# Patient Record
Sex: MALE | Race: BLACK OR AFRICAN AMERICAN | URBAN - METROPOLITAN AREA
[De-identification: names, ages, dates, MRNs, and addresses within clinical notes are randomized per-mention and may not be internally consistent; named-entity substitution may affect disease eponyms.]

---

## 2017-03-09 ENCOUNTER — PATIENT MESSAGE (OUTPATIENT)
Dept: FAMILY MEDICINE CLINIC | Facility: CLINIC | Age: 42
End: 2017-03-09

## 2017-03-13 NOTE — TELEPHONE ENCOUNTER
From: Vahe Camarena  To: rCis Cheatham DO  Sent: 3/9/2017 12:28 PM CST  Subject: Other    I have a question about PSA resulted on 11/27/16, 7:40 AM.    Would it be possible that I get these results fax to Dr. Mohini Zee office his fax number is [phone]

## 2017-11-08 ENCOUNTER — OFFICE VISIT (OUTPATIENT)
Dept: FAMILY MEDICINE CLINIC | Facility: CLINIC | Age: 42
End: 2017-11-08

## 2017-11-08 VITALS
DIASTOLIC BLOOD PRESSURE: 72 MMHG | HEART RATE: 77 BPM | HEIGHT: 75 IN | TEMPERATURE: 99 F | WEIGHT: 218 LBS | RESPIRATION RATE: 16 BRPM | BODY MASS INDEX: 27.1 KG/M2 | SYSTOLIC BLOOD PRESSURE: 118 MMHG

## 2017-11-08 DIAGNOSIS — R06.83 SNORING: Primary | ICD-10-CM

## 2017-11-08 DIAGNOSIS — R29.818 SUSPECTED SLEEP APNEA: ICD-10-CM

## 2017-11-08 PROCEDURE — 99214 OFFICE O/P EST MOD 30 MIN: CPT | Performed by: FAMILY MEDICINE

## 2017-11-08 PROCEDURE — 99212 OFFICE O/P EST SF 10 MIN: CPT | Performed by: FAMILY MEDICINE

## 2017-11-08 NOTE — PROGRESS NOTES
HPI:    Patient ID: Judi Michaud is a 38year old male. Snoring   This is a chronic problem. The current episode started more than 1 year ago. The problem occurs constantly. The problem has been gradually worsening. Associated symptoms include fatigue.  A symptoms worsen or fail to improve.          [id number]

## 2017-11-11 ENCOUNTER — OFFICE VISIT (OUTPATIENT)
Dept: FAMILY MEDICINE CLINIC | Facility: CLINIC | Age: 42
End: 2017-11-11

## 2017-11-11 ENCOUNTER — APPOINTMENT (OUTPATIENT)
Dept: LAB | Age: 42
End: 2017-11-11
Attending: FAMILY MEDICINE
Payer: COMMERCIAL

## 2017-11-11 VITALS
HEART RATE: 92 BPM | BODY MASS INDEX: 26.98 KG/M2 | WEIGHT: 217 LBS | RESPIRATION RATE: 16 BRPM | DIASTOLIC BLOOD PRESSURE: 73 MMHG | SYSTOLIC BLOOD PRESSURE: 136 MMHG | TEMPERATURE: 98 F | HEIGHT: 75 IN

## 2017-11-11 DIAGNOSIS — Z13.220 LIPID SCREENING: ICD-10-CM

## 2017-11-11 DIAGNOSIS — Z13.29 THYROID DISORDER SCREEN: ICD-10-CM

## 2017-11-11 DIAGNOSIS — R53.83 FATIGUE, UNSPECIFIED TYPE: ICD-10-CM

## 2017-11-11 DIAGNOSIS — Z00.00 ENCOUNTER FOR SCREENING AND PREVENTATIVE CARE: Primary | ICD-10-CM

## 2017-11-11 DIAGNOSIS — Z12.5 PROSTATE CANCER SCREENING: ICD-10-CM

## 2017-11-11 DIAGNOSIS — N52.9 ERECTILE DYSFUNCTION, UNSPECIFIED ERECTILE DYSFUNCTION TYPE: ICD-10-CM

## 2017-11-11 PROCEDURE — 99396 PREV VISIT EST AGE 40-64: CPT | Performed by: FAMILY MEDICINE

## 2017-11-11 RX ORDER — TADALAFIL 20 MG/1
20 TABLET ORAL
Qty: 8 TABLET | Refills: 2 | Status: SHIPPED | OUTPATIENT
Start: 2017-11-11 | End: 2017-11-11

## 2017-11-11 RX ORDER — TADALAFIL 20 MG/1
20 TABLET ORAL
Qty: 8 TABLET | Refills: 2 | Status: SHIPPED | OUTPATIENT
Start: 2017-11-11 | End: 2018-01-06

## 2017-11-11 NOTE — PROGRESS NOTES
HPI:    Patient ID: Irma Cruz is a 38year old male.     38year old AA male here for complete preventive care physical and for status update on any confirmed chronic medical illnesses and follow up on any previous labs or procedures that were suggestive place, and time. No cranial nerve deficit. ASSESSMENT/PLAN:   1. Encounter for screening and preventative care  Ordered and done  - EKG 12-LEAD;  Future  - CBC WITH DIFFERENTIAL WITH PLATELET  - COMP METABOLIC PANEL (14)  - URINALYSIS, ROUTINE

## 2017-12-27 ENCOUNTER — OFFICE VISIT (OUTPATIENT)
Dept: SLEEP CENTER | Age: 42
End: 2017-12-27
Attending: FAMILY MEDICINE
Payer: COMMERCIAL

## 2017-12-27 DIAGNOSIS — Z76.89 SLEEP CONCERN: Primary | ICD-10-CM

## 2017-12-27 PROCEDURE — 95810 POLYSOM 6/> YRS 4/> PARAM: CPT

## 2017-12-29 NOTE — PROCEDURES
[facility]  Accredited by the [employer] (AASM)    PATIENT'S NAME: Rehan Wild   ATTENDING PHYSICIAN: Leslie Mariscal DO   REFERRING PHYSICIAN: Leslie Mariscal DO   PATIENT ACCOUNT #: [de-identified] LOCATION: Sleep 7.5 events per hour. There were no significant periodic limb movements. The lowest desaturation was to 84%. The average heart rate is 81 beats per minute. There was no significant bradycardia, asystole, tachycardia, nor atrial fibrillation.   Sleep arch

## 2018-01-06 DIAGNOSIS — N52.9 ERECTILE DYSFUNCTION, UNSPECIFIED ERECTILE DYSFUNCTION TYPE: ICD-10-CM

## 2018-01-08 RX ORDER — TADALAFIL 20 MG/1
20 TABLET ORAL
Qty: 8 TABLET | Refills: 2 | Status: SHIPPED
Start: 2018-01-08 | End: 2018-10-12 | Stop reason: ALTCHOICE

## 2018-01-08 NOTE — TELEPHONE ENCOUNTER
Refill Protocol Appointment Criteria  · Appointment scheduled in the past 12 months or in the next 3 months  Recent Outpatient Visits            1 week ago Sleep concern    [facility]    Office Visit    1 month ago Encounter for screeni

## 2018-01-08 NOTE — TELEPHONE ENCOUNTER
From: Debbie Eason  Sent: 1/6/2018 8:34 PM CST  Subject: Medication Renewal Request    Debbie Eason would like a refill of the following medications:      Tadalafil (CIALIS) 20 MG Oral Tab Kristina Diez DO]    Preferred pharmacy: [facility]/PHARMACY #[id number] -

## 2018-01-09 ENCOUNTER — TELEPHONE (OUTPATIENT)
Dept: OTHER | Age: 43
End: 2018-01-09

## 2018-01-09 DIAGNOSIS — G47.30 SLEEP APNEA, UNSPECIFIED TYPE: Primary | ICD-10-CM

## 2018-01-09 NOTE — TELEPHONE ENCOUNTER
Patient returning a call (verified name and ), advised Dr Danitza Tilley note and agrees with the plan, Number given to call to make an appointment [phone]. Oneal Claude       Notes Recorded by La Antonio DO on 2018 at 2:39 PM CST  Mild sleep apnea co

## 2018-01-09 NOTE — TELEPHONE ENCOUNTER
----- Message from Rosa Melendez RN sent at 1/4/2018  9:38 AM CST -----      ----- Message -----  From: Gloria Garrett DO  Sent: 1/2/2018   2:39 PM  To: Em Fm Opo Lpn/Cma    Mild sleep apnea confirmed which becomes severe during REM (deep) sleep.  CPA

## 2018-03-12 ENCOUNTER — PATIENT MESSAGE (OUTPATIENT)
Dept: FAMILY MEDICINE CLINIC | Facility: CLINIC | Age: 43
End: 2018-03-12

## 2018-03-13 NOTE — TELEPHONE ENCOUNTER
From: Daniel Prado  To: Marylin Krause DO  Sent: 3/12/2018 12:37 PM CDT  Subject: Referral Request    Good afternoon  I was wondering would I have to come into your office, for a referral a neck specialist for my neck injury that I had a fe

## 2018-03-15 ENCOUNTER — OFFICE VISIT (OUTPATIENT)
Dept: FAMILY MEDICINE CLINIC | Facility: CLINIC | Age: 43
End: 2018-03-15

## 2018-03-15 VITALS
TEMPERATURE: 99 F | RESPIRATION RATE: 17 BRPM | SYSTOLIC BLOOD PRESSURE: 111 MMHG | HEIGHT: 75 IN | WEIGHT: 215.81 LBS | BODY MASS INDEX: 26.83 KG/M2 | HEART RATE: 86 BPM | DIASTOLIC BLOOD PRESSURE: 80 MMHG

## 2018-03-15 DIAGNOSIS — M54.12 CERVICAL RADICULOPATHY: Primary | ICD-10-CM

## 2018-03-15 PROCEDURE — 99212 OFFICE O/P EST SF 10 MIN: CPT | Performed by: FAMILY MEDICINE

## 2018-03-15 PROCEDURE — 99213 OFFICE O/P EST LOW 20 MIN: CPT | Performed by: FAMILY MEDICINE

## 2018-03-15 RX ORDER — NABUMETONE 500 MG/1
1000 TABLET, FILM COATED ORAL DAILY
Qty: 60 TABLET | Refills: 3 | Status: SHIPPED | OUTPATIENT
Start: 2018-03-15 | End: 2018-11-29 | Stop reason: ALTCHOICE

## 2018-03-15 RX ORDER — CYCLOBENZAPRINE HCL 10 MG
10 TABLET ORAL 3 TIMES DAILY
Qty: 30 TABLET | Refills: 0 | Status: SHIPPED | OUTPATIENT
Start: 2018-03-15 | End: 2018-03-26

## 2018-03-15 NOTE — PROGRESS NOTES
HPI:    Patient ID: Hayley Melendrez is a 38year old male. Neck Pain    This is a chronic problem. The current episode started more than 1 year ago. The problem has been gradually worsening. The pain is present in the left side.  The quality of the pain is d also showing congenital central canal narrowing. Recommend returning to physical therapy, schedule appointment with physiatry, nabumetone as directed daily, cyclobenzaprine at at bedtime. Reviewed instructions and side effects of all medications.   Call i

## 2018-03-27 RX ORDER — CYCLOBENZAPRINE HCL 10 MG
10 TABLET ORAL 3 TIMES DAILY
Qty: 30 TABLET | Refills: 0 | Status: SHIPPED | OUTPATIENT
Start: 2018-03-27 | End: 2018-04-16

## 2018-05-22 ENCOUNTER — PATIENT MESSAGE (OUTPATIENT)
Dept: FAMILY MEDICINE CLINIC | Facility: CLINIC | Age: 43
End: 2018-05-22

## 2018-05-22 NOTE — TELEPHONE ENCOUNTER
From: Irma Cruz  To: Gabby Dukes DO  Sent: 5/22/2018 10:22 AM CDT  Subject: Prescription Question    How are you Dr. Bradley [place] ?  A few years back you gave me prescription for Chinax I believe the name was to stop smoking cigarettes and I used it

## 2018-07-10 RX ORDER — VARENICLINE TARTRATE 0.5 (11)-1
KIT ORAL
Qty: 1 PACKAGE | Refills: 0 | Status: SHIPPED | OUTPATIENT
Start: 2018-07-10 | End: 2019-01-14

## 2018-09-04 ENCOUNTER — TELEPHONE (OUTPATIENT)
Dept: OTHER | Age: 43
End: 2018-09-04

## 2018-09-04 NOTE — TELEPHONE ENCOUNTER
Pt calling to request CPAP machine. Per pt he has not scheduled titration. Informed pt needs to schedule Titration to obtain settings. Order can then be placed for machine. Pt verb understanding. Sleep Center number given to pt.

## 2018-09-10 ENCOUNTER — OFFICE VISIT (OUTPATIENT)
Dept: SLEEP CENTER | Age: 43
End: 2018-09-10
Attending: FAMILY MEDICINE
Payer: COMMERCIAL

## 2018-09-10 DIAGNOSIS — Z76.89 SLEEP CONCERN: Primary | ICD-10-CM

## 2018-09-10 PROCEDURE — 95811 POLYSOM 6/>YRS CPAP 4/> PARM: CPT

## 2018-09-12 NOTE — PROCEDURES
[address] [facility]  Accredited by the [employer] Medicine (AASM)    PATIENT'S NAME: Romi Hood   ATTENDING PHYSICIAN: Khushbu Simon DO   REFERRING PHYSICIAN: Khushbu Simon DO   PATIENT ACCOUNT #: [de-identified] LOCATION: Sleep 4.9 events per hour. There were no significant periodic limb movements. The lowest desaturation was to 85%. The average heart rate is 65 beats per minute, and there was no significant bradycardia, asystole, tachycardia, or atrial fibrillation.       CPAP

## 2018-10-12 ENCOUNTER — OFFICE VISIT (OUTPATIENT)
Dept: FAMILY MEDICINE CLINIC | Facility: CLINIC | Age: 43
End: 2018-10-12
Payer: COMMERCIAL

## 2018-10-12 VITALS
RESPIRATION RATE: 16 BRPM | TEMPERATURE: 98 F | HEART RATE: 89 BPM | BODY MASS INDEX: 28.15 KG/M2 | DIASTOLIC BLOOD PRESSURE: 84 MMHG | WEIGHT: 217 LBS | HEIGHT: 73.75 IN | SYSTOLIC BLOOD PRESSURE: 130 MMHG

## 2018-10-12 DIAGNOSIS — G47.33 OSA ON CPAP: Primary | ICD-10-CM

## 2018-10-12 DIAGNOSIS — Z99.89 OSA ON CPAP: Primary | ICD-10-CM

## 2018-10-12 DIAGNOSIS — M99.01 CERVICOTHORACIC SOMATIC DYSFUNCTION: ICD-10-CM

## 2018-10-12 PROCEDURE — 99214 OFFICE O/P EST MOD 30 MIN: CPT | Performed by: FAMILY MEDICINE

## 2018-10-12 PROCEDURE — 99212 OFFICE O/P EST SF 10 MIN: CPT | Performed by: FAMILY MEDICINE

## 2018-10-12 PROCEDURE — 98925 OSTEOPATH MANJ 1-2 REGIONS: CPT | Performed by: FAMILY MEDICINE

## 2018-10-12 RX ORDER — CLINDAMYCIN PHOSPHATE 10 MG/G
GEL TOPICAL
Refills: 3 | COMMUNITY
Start: 2018-10-06 | End: 2019-08-22 | Stop reason: ALTCHOICE

## 2018-10-12 RX ORDER — MINOCYCLINE HYDROCHLORIDE 100 MG/1
100 TABLET ORAL 2 TIMES DAILY
Refills: 2 | COMMUNITY
Start: 2018-10-06 | End: 2019-08-22 | Stop reason: ALTCHOICE

## 2018-10-12 NOTE — PATIENT INSTRUCTIONS
Comply with CPAP appliance. Referred to pulmonary (apnea expert). Encouraged physical fitness and daily physical activity daily (PLAY). Recommend weight loss via daily exercising and consistent healthy dietary changes.   Monitor blood pressures and recor

## 2018-10-24 ENCOUNTER — PATIENT MESSAGE (OUTPATIENT)
Dept: FAMILY MEDICINE CLINIC | Facility: CLINIC | Age: 43
End: 2018-10-24

## 2018-10-24 NOTE — TELEPHONE ENCOUNTER
From: Rahul Cortez  To: Penny Eaton,   Sent: 10/24/2018 12:21 PM CDT  Subject: Other    I was wondering how does it usually take to hear from someone about setting me up for the sleep apnea machine because at the moment I haven't heard from anyone

## 2018-10-26 ENCOUNTER — TELEPHONE (OUTPATIENT)
Dept: OTHER | Age: 43
End: 2018-10-26

## 2018-10-26 NOTE — TELEPHONE ENCOUNTER
Pt called stated he have not heard anything about his CPAP since LOV 10/12/18- order placed 10/12/18- no documentation of info faxed to supplier- hand off to [facility]

## 2018-11-29 ENCOUNTER — OFFICE VISIT (OUTPATIENT)
Dept: FAMILY MEDICINE CLINIC | Facility: CLINIC | Age: 43
End: 2018-11-29
Payer: COMMERCIAL

## 2018-11-29 VITALS
WEIGHT: 224 LBS | HEIGHT: 74 IN | TEMPERATURE: 97 F | DIASTOLIC BLOOD PRESSURE: 84 MMHG | SYSTOLIC BLOOD PRESSURE: 110 MMHG | BODY MASS INDEX: 28.75 KG/M2 | HEART RATE: 90 BPM

## 2018-11-29 DIAGNOSIS — G47.33 OSA ON CPAP: ICD-10-CM

## 2018-11-29 DIAGNOSIS — M99.01 CERVICOTHORACIC SOMATIC DYSFUNCTION: ICD-10-CM

## 2018-11-29 DIAGNOSIS — Z11.3 SCREEN FOR STD (SEXUALLY TRANSMITTED DISEASE): ICD-10-CM

## 2018-11-29 DIAGNOSIS — Z99.89 OSA ON CPAP: ICD-10-CM

## 2018-11-29 DIAGNOSIS — M99.03 SOMATIC DYSFUNCTION OF LUMBOSACRAL REGION: ICD-10-CM

## 2018-11-29 DIAGNOSIS — Z13.29 THYROID DISORDER SCREEN: ICD-10-CM

## 2018-11-29 DIAGNOSIS — Z13.220 LIPID SCREENING: ICD-10-CM

## 2018-11-29 DIAGNOSIS — Z12.5 PROSTATE CANCER SCREENING: Primary | ICD-10-CM

## 2018-11-29 DIAGNOSIS — Z00.00 ROUTINE PHYSICAL EXAMINATION: ICD-10-CM

## 2018-11-29 PROCEDURE — 93005 ELECTROCARDIOGRAM TRACING: CPT | Performed by: FAMILY MEDICINE

## 2018-11-29 PROCEDURE — 99396 PREV VISIT EST AGE 40-64: CPT | Performed by: FAMILY MEDICINE

## 2018-11-29 PROCEDURE — 98926 OSTEOPATH MANJ 3-4 REGIONS: CPT | Performed by: FAMILY MEDICINE

## 2018-11-29 PROCEDURE — 90471 IMMUNIZATION ADMIN: CPT | Performed by: FAMILY MEDICINE

## 2018-11-29 PROCEDURE — 93000 ELECTROCARDIOGRAM COMPLETE: CPT | Performed by: FAMILY MEDICINE

## 2018-11-29 PROCEDURE — 90686 IIV4 VACC NO PRSV 0.5 ML IM: CPT | Performed by: FAMILY MEDICINE

## 2018-11-29 NOTE — PROCEDURES
Multiple vertebral segments in cervical, lumbar and thoracic region misaligned. Manual osteopathic manipulative therapy performed and immediate relief obtained. Patient instantaneously improved.

## 2018-11-29 NOTE — PATIENT INSTRUCTIONS
All adult screening ordered and done appropriate for patient's age and gender and risk factors and complaints. OMT done. Monitor blood pressures and record at home. Limit salt intake. Encouraged physical fitness and daily physical activity daily (PLAY).

## 2018-11-29 NOTE — PROGRESS NOTES
HPI:    Patient ID: Myra Albert is a 37year old male.     37year old AA male here for complete preventive care physical and for status update on any confirmed chronic medical illnesses and follow up on any previous labs or procedures that were suggestive MG X 42 Oral Tab AS ON PACKAGE Disp: 1 Package Rfl: 0     Allergies:No Known Allergies   PHYSICAL EXAM:   Physical Exam    Constitutional: He is oriented to person, place, and time. He appears well-developed and well-nourished.    HENT:   Right Ear: Tympani (14)  - ELECTROCARDIOGRAM, COMPLETENormal sinus rate and rhythm. No Q waves. Possible early repolarization (anterolateral) Nonspecific T wave changes. Borderline EKG.  Nothing further needed at this time.;     8. EFRA on CPAP  Comply    Orders Placed This En

## 2018-11-30 ENCOUNTER — TELEPHONE (OUTPATIENT)
Dept: FAMILY MEDICINE CLINIC | Facility: CLINIC | Age: 43
End: 2018-11-30

## 2018-11-30 DIAGNOSIS — M62.830 SPASM OF MUSCLE OF LOWER BACK: Primary | ICD-10-CM

## 2018-11-30 RX ORDER — CYCLOBENZAPRINE HCL 5 MG
5 TABLET ORAL NIGHTLY
Qty: 30 TABLET | Refills: 0 | Status: SHIPPED | OUTPATIENT
Start: 2018-11-30 | End: 2019-01-11

## 2018-11-30 NOTE — TELEPHONE ENCOUNTER
Patient aware that Cyclobenzaprine HCl 5 MG Oral Tab sent to pharmacy. Patient wanted RN to tell Dr. Quan Aparicio, thank you.

## 2018-11-30 NOTE — TELEPHONE ENCOUNTER
Patient seen in [address] [place] [address] Rd 11/29/18, mentioned at appt to be having some back pain for the last week, pulled lower back muscle shoveling the snow. Has been using Tylenol for pain also applying heat to lower back area.  Symptoms continue with little improvement over

## 2019-01-11 DIAGNOSIS — M62.830 SPASM OF MUSCLE OF LOWER BACK: ICD-10-CM

## 2019-01-12 NOTE — TELEPHONE ENCOUNTER
Review pended refill request as it does not fall under a protocol.     Requested Prescriptions     Pending Prescriptions Disp Refills   • CYCLOBENZAPRINE HCL 5 MG Oral Tab [Pharmacy Med Name: CYCLOBENZAPRINE 5 MG TABLET] 30 tablet 0     Sig: TAKE 1 TABLET B

## 2019-01-14 ENCOUNTER — OFFICE VISIT (OUTPATIENT)
Dept: PULMONOLOGY | Facility: CLINIC | Age: 44
End: 2019-01-14
Payer: COMMERCIAL

## 2019-01-14 VITALS
WEIGHT: 226.63 LBS | HEART RATE: 86 BPM | HEIGHT: 74 IN | DIASTOLIC BLOOD PRESSURE: 77 MMHG | BODY MASS INDEX: 29.09 KG/M2 | SYSTOLIC BLOOD PRESSURE: 153 MMHG | OXYGEN SATURATION: 95 %

## 2019-01-14 DIAGNOSIS — G47.33 OSA (OBSTRUCTIVE SLEEP APNEA): Primary | ICD-10-CM

## 2019-01-14 PROCEDURE — 99243 OFF/OP CNSLTJ NEW/EST LOW 30: CPT | Performed by: INTERNAL MEDICINE

## 2019-01-14 PROCEDURE — 99212 OFFICE O/P EST SF 10 MIN: CPT | Performed by: INTERNAL MEDICINE

## 2019-01-14 RX ORDER — CYCLOBENZAPRINE HCL 5 MG
TABLET ORAL
Qty: 30 TABLET | Refills: 0 | Status: SHIPPED | OUTPATIENT
Start: 2019-01-14 | End: 2019-01-14

## 2019-01-14 NOTE — PROGRESS NOTES
Dear Teo Calabrese:           As you know, Ruth Gómez is a 29-year-old male who I am now evaluating for obstructive sleep apnea. HISTORY OF PRESENT ILLNESS: The patient has a history of snoring and restless sleep with 2-3 times per night nocturia.   He has diffi round and reactive to light and accommodation. Neck without adenopathy, thyromegaly, JVD nor bruit. Lungs clear to auscultation and percussion. Cardiac regular rate and rhythm no murmur.  Abdomen nontender, without hepatosplenomegaly and no mass appreciable

## 2019-07-10 ENCOUNTER — PATIENT MESSAGE (OUTPATIENT)
Dept: FAMILY MEDICINE CLINIC | Facility: CLINIC | Age: 44
End: 2019-07-10

## 2019-07-11 NOTE — TELEPHONE ENCOUNTER
Please let patient know Dr. Bradley [place] out of town. But I think it is most likely he will want the patient to make appointment.

## 2019-07-11 NOTE — TELEPHONE ENCOUNTER
From: Debbie Eason  To: Reyes Puller, DO  Sent: 7/10/2019 10:54 PM CDT  Subject: Prescription Question    Forrest Brooks([facility])lol, but seriously I was wondering would I be able to get a prescription for Erectile Dysfunction without coming in for

## 2019-08-22 ENCOUNTER — OFFICE VISIT (OUTPATIENT)
Dept: FAMILY MEDICINE CLINIC | Facility: CLINIC | Age: 44
End: 2019-08-22
Payer: COMMERCIAL

## 2019-08-22 VITALS
TEMPERATURE: 98 F | DIASTOLIC BLOOD PRESSURE: 82 MMHG | HEART RATE: 73 BPM | RESPIRATION RATE: 17 BRPM | HEIGHT: 74 IN | WEIGHT: 221 LBS | BODY MASS INDEX: 28.36 KG/M2 | SYSTOLIC BLOOD PRESSURE: 130 MMHG

## 2019-08-22 DIAGNOSIS — N52.9 ERECTILE DYSFUNCTION, UNSPECIFIED ERECTILE DYSFUNCTION TYPE: Primary | ICD-10-CM

## 2019-08-22 PROCEDURE — 99214 OFFICE O/P EST MOD 30 MIN: CPT | Performed by: FAMILY MEDICINE

## 2019-08-22 RX ORDER — TADALAFIL 20 MG/1
20 TABLET ORAL
Qty: 8 TABLET | Refills: 2 | Status: SHIPPED | OUTPATIENT
Start: 2019-08-22 | End: 2020-09-14

## 2019-08-22 NOTE — PATIENT INSTRUCTIONS
Monitor blood pressures and record at home. Limit salt intake. Encouraged physical fitness and daily physical activity daily (PLAY). Cialis prescribed at 20 mg every 3 days as needed for ED.

## 2019-08-22 NOTE — PROGRESS NOTES
HPI:    Patient ID: Priscilla Pope is a 53year old male. Patient is a 53-year-old -American male who presents today for advice and/or treatment/prescription for ED. Patient has had issues with ED now on and off for the last 2 years.   The patient c home. Limit salt intake. Encouraged physical fitness and daily physical activity daily (PLAY). Cialis prescribed at 20 mg every 3 days as needed for ED. Return in about 3 months (around 11/22/2019), or if symptoms worsen or fail to improve.

## 2019-09-12 ENCOUNTER — OFFICE VISIT (OUTPATIENT)
Dept: FAMILY MEDICINE CLINIC | Facility: CLINIC | Age: 44
End: 2019-09-12
Payer: COMMERCIAL

## 2019-09-12 ENCOUNTER — APPOINTMENT (OUTPATIENT)
Dept: LAB | Facility: HOSPITAL | Age: 44
End: 2019-09-12
Attending: FAMILY MEDICINE
Payer: COMMERCIAL

## 2019-09-12 VITALS
BODY MASS INDEX: 28.36 KG/M2 | SYSTOLIC BLOOD PRESSURE: 130 MMHG | WEIGHT: 221 LBS | HEIGHT: 74 IN | DIASTOLIC BLOOD PRESSURE: 72 MMHG | RESPIRATION RATE: 17 BRPM | HEART RATE: 68 BPM

## 2019-09-12 DIAGNOSIS — Z12.11 COLON CANCER SCREENING: ICD-10-CM

## 2019-09-12 DIAGNOSIS — Z12.5 PROSTATE CANCER SCREENING: ICD-10-CM

## 2019-09-12 DIAGNOSIS — L91.0 KELOID: ICD-10-CM

## 2019-09-12 DIAGNOSIS — Z13.29 THYROID DISORDER SCREEN: ICD-10-CM

## 2019-09-12 DIAGNOSIS — Z13.220 LIPID SCREENING: ICD-10-CM

## 2019-09-12 DIAGNOSIS — Z00.00 ROUTINE PHYSICAL EXAMINATION: Primary | ICD-10-CM

## 2019-09-12 PROCEDURE — 11900 INJECT SKIN LESIONS </W 7: CPT | Performed by: FAMILY MEDICINE

## 2019-09-12 PROCEDURE — 93000 ELECTROCARDIOGRAM COMPLETE: CPT | Performed by: FAMILY MEDICINE

## 2019-09-12 PROCEDURE — 36415 COLL VENOUS BLD VENIPUNCTURE: CPT

## 2019-09-12 PROCEDURE — 99396 PREV VISIT EST AGE 40-64: CPT | Performed by: FAMILY MEDICINE

## 2019-09-12 RX ORDER — TRIAMCINOLONE ACETONIDE 40 MG/ML
40 INJECTION, SUSPENSION INTRA-ARTICULAR; INTRAMUSCULAR ONCE
Status: SHIPPED | OUTPATIENT
Start: 2019-09-12

## 2019-09-12 NOTE — PATIENT INSTRUCTIONS
All adult screening ordered and done appropriate for patient's age and gender and risk factors and complaints. Monitor blood pressures and record at home. Limit salt intake. Encouraged physical fitness and daily physical activity daily.   Patient referred

## 2019-09-13 LAB
% FREE PSA: 25 % (CALC)
ABSOLUTE BASOPHILS: 29 CELLS/UL (ref 0–200)
ABSOLUTE EOSINOPHILS: 99 CELLS/UL (ref 15–500)
ABSOLUTE LYMPHOCYTES: 1995 CELLS/UL (ref 850–3900)
ABSOLUTE MONOCYTES: 539 CELLS/UL (ref 200–950)
ABSOLUTE NEUTROPHILS: 3138 CELLS/UL (ref 1500–7800)
ALBUMIN/GLOBULIN RATIO: 1.4 (CALC) (ref 1–2.5)
ALBUMIN: 4.4 G/DL (ref 3.6–5.1)
ALKALINE PHOSPHATASE: 67 U/L (ref 40–115)
ALT: 33 U/L (ref 9–46)
APPEARANCE: CLEAR
AST: 41 U/L (ref 10–40)
BASOPHILS: 0.5 %
BILIRUBIN, TOTAL: 0.6 MG/DL (ref 0.2–1.2)
BILIRUBIN: NEGATIVE
BUN: 14 MG/DL (ref 7–25)
CALCIUM: 9.4 MG/DL (ref 8.6–10.3)
CARBON DIOXIDE: 26 MMOL/L (ref 20–32)
CHLORIDE: 104 MMOL/L (ref 98–110)
CHOL/HDLC RATIO: 4.2 (CALC)
CHOLESTEROL, TOTAL: 203 MG/DL
COLOR: YELLOW
CREATININE: 1.32 MG/DL (ref 0.6–1.35)
EGFR IF AFRICN AM: 74 ML/MIN/1.73M2
EGFR IF NONAFRICN AM: 64 ML/MIN/1.73M2
EOSINOPHILS: 1.7 %
FREE PSA: 0.2 NG/ML
GLOBULIN: 3.2 G/DL (CALC) (ref 1.9–3.7)
GLUCOSE: 107 MG/DL (ref 65–99)
GLUCOSE: NEGATIVE
HDL CHOLESTEROL: 48 MG/DL
HEMATOCRIT: 40.7 % (ref 38.5–50)
HEMOGLOBIN: 14.2 G/DL (ref 13.2–17.1)
KETONES: NEGATIVE
LDL-CHOLESTEROL: 135 MG/DL (CALC)
LEUKOCYTE ESTERASE: NEGATIVE
LYMPHOCYTES: 34.4 %
MCH: 30.1 PG (ref 27–33)
MCHC: 34.9 G/DL (ref 32–36)
MCV: 86.4 FL (ref 80–100)
MONOCYTES: 9.3 %
MPV: 9.7 FL (ref 7.5–12.5)
NEUTROPHILS: 54.1 %
NITRITE: NEGATIVE
NON-HDL CHOLESTEROL: 155 MG/DL (CALC)
OCCULT BLOOD: NEGATIVE
PH: 7 (ref 5–8)
PLATELET COUNT: 335 THOUSAND/UL (ref 140–400)
POTASSIUM: 4.6 MMOL/L (ref 3.5–5.3)
PROTEIN, TOTAL: 7.6 G/DL (ref 6.1–8.1)
PROTEIN: NEGATIVE
RDW: 13.5 % (ref 11–15)
RED BLOOD CELL COUNT: 4.71 MILLION/UL (ref 4.2–5.8)
SODIUM: 135 MMOL/L (ref 135–146)
SPECIFIC GRAVITY: 1.02 (ref 1–1.03)
TOTAL PSA: 0.8 NG/ML
TRIGLYCERIDES: 94 MG/DL
TSH W/REFLEX TO FT4: 1.36 MIU/L (ref 0.4–4.5)
WHITE BLOOD CELL COUNT: 5.8 THOUSAND/UL (ref 3.8–10.8)

## 2019-09-13 NOTE — PROGRESS NOTES
HPI:    Patient ID: Charo Escobar is a 53year old male.     Patient is a 64-year-old -American male here today for preventive care with only one additional concern about a keloid that is growing from a pimple/folliculitis in the posterior occiput/neck 3. Lipid screening  Screened  - LIPID PANEL    4. Thyroid disorder screen  Screened  - TSH W REFLEX TO FREE T4    5.  Colon cancer screening  Referred  - GASTRO - INTERNAL    6. Keloid  Injected with 40 mg of triamcinolone mixed with 1% lidocaine (1 cc)

## 2019-10-14 ENCOUNTER — PATIENT MESSAGE (OUTPATIENT)
Dept: FAMILY MEDICINE CLINIC | Facility: CLINIC | Age: 44
End: 2019-10-14

## 2019-10-15 ENCOUNTER — NURSE TRIAGE (OUTPATIENT)
Dept: FAMILY MEDICINE CLINIC | Facility: CLINIC | Age: 44
End: 2019-10-15

## 2019-10-15 ENCOUNTER — TELEPHONE (OUTPATIENT)
Dept: FAMILY MEDICINE CLINIC | Facility: CLINIC | Age: 44
End: 2019-10-15

## 2019-10-15 RX ORDER — CODEINE PHOSPHATE AND GUAIFENESIN 10; 100 MG/5ML; MG/5ML
5 SOLUTION ORAL EVERY 6 HOURS PRN
Qty: 180 ML | Refills: 0 | OUTPATIENT
Start: 2019-10-15 | End: 2020-09-19 | Stop reason: ALTCHOICE

## 2019-10-15 NOTE — TELEPHONE ENCOUNTER
From: Myra Albert  To: Dolly Scott DO  Sent: 10/14/2019 10:33 PM CDT  Subject: Other    I've had a productive cough for 3 weeks due to my CPAP machine. I hear a fluid build up at night while trying to sleep .  I need a chest X-ray and possibly a an

## 2019-10-15 NOTE — TELEPHONE ENCOUNTER
Action Requested: Summary for Provider     []  Critical Lab, Recommendations Needed  [x] Need Additional Advice  []   FYI    []   Need Orders  [] Need Medications Sent to Pharmacy  []  Other     SUMMARY: Patient calling with complaint of cough that has bee

## 2019-10-15 NOTE — TELEPHONE ENCOUNTER
Patient returned call, advised of treatment recommendations per Dr Néstor Barros, patient agreed, also confirmed pharmacy is the   [facility] [place]. Will return call to pharmacy to phone in order when pharmacist is available.

## 2019-10-15 NOTE — TELEPHONE ENCOUNTER
LMTCB transfer to triage  Advised patient to call triage on MyChart    ----- Message from Pradeep Tripp sent at 10/14/2019 10:33 PM CDT -----  Regarding: Other  Contact: [phone]  I've had a productive cough for 3 weeks due to my CPAP machine.  I hear a f

## 2019-10-15 NOTE — TELEPHONE ENCOUNTER
This nurse called in the rx for the guaiFENesin-codeine into [address] [facility] this nurse spoke with Bienvenido Nayak the pharmacist.The pharmacist also confirmed they will reach out to the patient once the medication is ready for pick-up.

## 2019-10-15 NOTE — TELEPHONE ENCOUNTER
Reviewed. Rx for cough medicine sent for pharmacy. Use this only at night, causes sedation. Please phone and as the ER X not working for this. Appointment if fever, shortness of breath, not resolved in 1 week.

## 2019-10-15 NOTE — TELEPHONE ENCOUNTER
Attempt made to contact the patient; no answer LMTCB. Attempt made to contact [facility] to call in the script for the cough medicine. This nurse was informed that the pharmacist was on a conference call and she would be available in 1 hour.

## 2020-05-28 ENCOUNTER — OFFICE VISIT (OUTPATIENT)
Dept: PULMONOLOGY | Facility: CLINIC | Age: 45
End: 2020-05-28
Payer: COMMERCIAL

## 2020-05-28 VITALS
SYSTOLIC BLOOD PRESSURE: 132 MMHG | BODY MASS INDEX: 26.73 KG/M2 | HEIGHT: 75 IN | RESPIRATION RATE: 18 BRPM | WEIGHT: 215 LBS | DIASTOLIC BLOOD PRESSURE: 81 MMHG | OXYGEN SATURATION: 97 % | HEART RATE: 76 BPM

## 2020-05-28 DIAGNOSIS — G47.33 OSA (OBSTRUCTIVE SLEEP APNEA): Primary | ICD-10-CM

## 2020-05-28 PROCEDURE — 99213 OFFICE O/P EST LOW 20 MIN: CPT | Performed by: INTERNAL MEDICINE

## 2020-05-28 NOTE — PROGRESS NOTES
The patient is a 40-year-old male who I know well from prior evaluation who comes in now for follow-up. In general, he is doing well with his device although he does not want to use it anymore.   His download is excellent with average daily usage of 6 hour

## 2020-06-09 ENCOUNTER — TELEPHONE (OUTPATIENT)
Dept: FAMILY MEDICINE CLINIC | Facility: CLINIC | Age: 45
End: 2020-06-09

## 2020-06-09 NOTE — TELEPHONE ENCOUNTER
Almita Goes with [facility] called, as she is faxing paperwork over for a CPAP machine that they need Dr. Gaviota Parker to fill out for the patient.

## 2020-06-12 ENCOUNTER — PATIENT MESSAGE (OUTPATIENT)
Dept: FAMILY MEDICINE CLINIC | Facility: CLINIC | Age: 45
End: 2020-06-12

## 2020-06-12 NOTE — TELEPHONE ENCOUNTER
From: Daniel Prado  To:  Kirti Wynn, DO  Sent: 6/12/2020 11:53 AM CDT  Subject: Other    I have a physical appointment with Daphne Weaver on June 27, I actually will be free on Tuesday the 16 th if there any cancellations that day I could come in then

## 2020-06-22 ENCOUNTER — PATIENT MESSAGE (OUTPATIENT)
Dept: FAMILY MEDICINE CLINIC | Facility: CLINIC | Age: 45
End: 2020-06-22

## 2020-06-22 NOTE — TELEPHONE ENCOUNTER
From: Pradeep Tripp  To:  Jenifer Hubbard DO  Sent: 6/22/2020 4:38 PM CDT  Subject: Prescription Question    Supplies

## 2020-06-22 NOTE — TELEPHONE ENCOUNTER
Triage Support, please see pt MyChart response to your question in the other MyChart encounter from today regarding CPAP supplies.

## 2020-09-13 DIAGNOSIS — N52.9 ERECTILE DYSFUNCTION, UNSPECIFIED ERECTILE DYSFUNCTION TYPE: ICD-10-CM

## 2020-09-14 RX ORDER — TADALAFIL 20 MG/1
TABLET ORAL
Qty: 4 TABLET | Refills: 5 | Status: SHIPPED | OUTPATIENT
Start: 2020-09-14 | End: 2020-11-09

## 2020-09-19 ENCOUNTER — OFFICE VISIT (OUTPATIENT)
Dept: FAMILY MEDICINE CLINIC | Facility: CLINIC | Age: 45
End: 2020-09-19
Payer: COMMERCIAL

## 2020-09-19 VITALS
TEMPERATURE: 98 F | WEIGHT: 218 LBS | BODY MASS INDEX: 27.1 KG/M2 | HEIGHT: 75 IN | HEART RATE: 71 BPM | SYSTOLIC BLOOD PRESSURE: 122 MMHG | DIASTOLIC BLOOD PRESSURE: 76 MMHG | RESPIRATION RATE: 18 BRPM

## 2020-09-19 DIAGNOSIS — Z00.00 ROUTINE PHYSICAL EXAMINATION: Primary | ICD-10-CM

## 2020-09-19 PROCEDURE — 90471 IMMUNIZATION ADMIN: CPT | Performed by: FAMILY MEDICINE

## 2020-09-19 PROCEDURE — 3008F BODY MASS INDEX DOCD: CPT | Performed by: FAMILY MEDICINE

## 2020-09-19 PROCEDURE — 3078F DIAST BP <80 MM HG: CPT | Performed by: FAMILY MEDICINE

## 2020-09-19 PROCEDURE — 90686 IIV4 VACC NO PRSV 0.5 ML IM: CPT | Performed by: FAMILY MEDICINE

## 2020-09-19 PROCEDURE — 93000 ELECTROCARDIOGRAM COMPLETE: CPT | Performed by: FAMILY MEDICINE

## 2020-09-19 PROCEDURE — 99396 PREV VISIT EST AGE 40-64: CPT | Performed by: FAMILY MEDICINE

## 2020-09-19 PROCEDURE — 3074F SYST BP LT 130 MM HG: CPT | Performed by: FAMILY MEDICINE

## 2020-09-19 NOTE — PROGRESS NOTES
HPI:    Patient ID: Seng Chatterjee is a 50year old male.     This patient is a 78-year-old -American male here for complete preventive care physical and for status update on any confirmed chronic medical illnesses and follow up on any previous labs or PSA, TOTAL WITH REFLEX TO PSA, FREE    Orders Placed This Encounter      CBC W Differential W Platelet [E]      Comp Metabolic Panel (14) [E]      Lipid Panel [E]      Urinalysis, Routine [E]      TSH W Reflex To Free T4 [E]      PSA, TOTAL W REFLEX TO PSA

## 2020-09-19 NOTE — PATIENT INSTRUCTIONS
All adult screening ordered and done appropriate for patient's age and gender and risk factors and complaints. Monitor blood pressures and record at home. Limit salt intake. Medication reviewed and renewed where needed and appropriate.   Comply with medic

## 2020-09-20 LAB
ABSOLUTE BASOPHILS: 40 CELLS/UL (ref 0–200)
ABSOLUTE EOSINOPHILS: 80 CELLS/UL (ref 15–500)
ABSOLUTE LYMPHOCYTES: 1955 CELLS/UL (ref 850–3900)
ABSOLUTE MONOCYTES: 479 CELLS/UL (ref 200–950)
ABSOLUTE NEUTROPHILS: 3146 CELLS/UL (ref 1500–7800)
ALBUMIN/GLOBULIN RATIO: 1.3 (CALC) (ref 1–2.5)
ALBUMIN: 4.3 G/DL (ref 3.6–5.1)
ALKALINE PHOSPHATASE: 68 U/L (ref 36–130)
ALT: 24 U/L (ref 9–46)
APPEARANCE: CLEAR
AST: 27 U/L (ref 10–40)
BASOPHILS: 0.7 %
BILIRUBIN, TOTAL: 0.5 MG/DL (ref 0.2–1.2)
BILIRUBIN: NEGATIVE
BUN: 14 MG/DL (ref 7–25)
CALCIUM: 9.7 MG/DL (ref 8.6–10.3)
CARBON DIOXIDE: 24 MMOL/L (ref 20–32)
CHLORIDE: 102 MMOL/L (ref 98–110)
CHOL/HDLC RATIO: 3.5 (CALC)
CHOLESTEROL, TOTAL: 206 MG/DL
COLOR: YELLOW
CREATININE: 1.32 MG/DL (ref 0.6–1.35)
EGFR IF AFRICN AM: 74 ML/MIN/1.73M2
EGFR IF NONAFRICN AM: 64 ML/MIN/1.73M2
EOSINOPHILS: 1.4 %
GLOBULIN: 3.4 G/DL (CALC) (ref 1.9–3.7)
GLUCOSE: 110 MG/DL (ref 65–99)
GLUCOSE: NEGATIVE
HDL CHOLESTEROL: 59 MG/DL
HEMATOCRIT: 41.3 % (ref 38.5–50)
HEMOGLOBIN: 14 G/DL (ref 13.2–17.1)
KETONES: NEGATIVE
LDL-CHOLESTEROL: 131 MG/DL (CALC)
LEUKOCYTE ESTERASE: NEGATIVE
LYMPHOCYTES: 34.3 %
MCH: 29.9 PG (ref 27–33)
MCHC: 33.9 G/DL (ref 32–36)
MCV: 88.1 FL (ref 80–100)
MONOCYTES: 8.4 %
MPV: 9.6 FL (ref 7.5–12.5)
NEUTROPHILS: 55.2 %
NITRITE: NEGATIVE
NON-HDL CHOLESTEROL: 147 MG/DL (CALC)
OCCULT BLOOD: NEGATIVE
PH: 7.5 (ref 5–8)
PLATELET COUNT: 318 THOUSAND/UL (ref 140–400)
POTASSIUM: 4.8 MMOL/L (ref 3.5–5.3)
PROTEIN, TOTAL: 7.7 G/DL (ref 6.1–8.1)
PROTEIN: NEGATIVE
RDW: 13.8 % (ref 11–15)
RED BLOOD CELL COUNT: 4.69 MILLION/UL (ref 4.2–5.8)
SODIUM: 137 MMOL/L (ref 135–146)
SPECIFIC GRAVITY: 1.02 (ref 1–1.03)
TRIGLYCERIDES: 64 MG/DL
TSH W/REFLEX TO FT4: 1.58 MIU/L (ref 0.4–4.5)
WHITE BLOOD CELL COUNT: 5.7 THOUSAND/UL (ref 3.8–10.8)

## 2020-09-21 LAB — TOTAL PSA: 1.4 NG/ML

## 2020-09-23 DIAGNOSIS — R73.9 ELEVATED BLOOD SUGAR: Primary | ICD-10-CM

## 2020-09-23 NOTE — PROGRESS NOTES
Nothing further needed since is too late to add a test.  The patient's blood sugar was not diagnostic of diabetes. I can retest him when he comes in for any other reason.

## 2020-09-26 NOTE — PROGRESS NOTES
Called [facility] today they are unable to Add A1C since blood drawn was done on 9/19/20. Patient would have to get bloodwork again.

## 2020-11-09 DIAGNOSIS — N52.9 ERECTILE DYSFUNCTION, UNSPECIFIED ERECTILE DYSFUNCTION TYPE: ICD-10-CM

## 2020-11-10 RX ORDER — TADALAFIL 20 MG/1
20 TABLET ORAL
Qty: 4 TABLET | Refills: 5 | Status: SHIPPED | OUTPATIENT
Start: 2020-11-10

## 2021-03-29 ENCOUNTER — OFFICE VISIT (OUTPATIENT)
Dept: PULMONOLOGY | Facility: CLINIC | Age: 46
End: 2021-03-29
Payer: COMMERCIAL

## 2021-03-29 VITALS
RESPIRATION RATE: 18 BRPM | WEIGHT: 221 LBS | BODY MASS INDEX: 27.48 KG/M2 | TEMPERATURE: 98 F | DIASTOLIC BLOOD PRESSURE: 84 MMHG | SYSTOLIC BLOOD PRESSURE: 143 MMHG | HEIGHT: 75 IN | HEART RATE: 86 BPM | OXYGEN SATURATION: 98 %

## 2021-03-29 DIAGNOSIS — G47.33 OSA (OBSTRUCTIVE SLEEP APNEA): Primary | ICD-10-CM

## 2021-03-29 PROCEDURE — 3079F DIAST BP 80-89 MM HG: CPT | Performed by: INTERNAL MEDICINE

## 2021-03-29 PROCEDURE — 3077F SYST BP >= 140 MM HG: CPT | Performed by: INTERNAL MEDICINE

## 2021-03-29 PROCEDURE — 99213 OFFICE O/P EST LOW 20 MIN: CPT | Performed by: INTERNAL MEDICINE

## 2021-03-29 PROCEDURE — 3008F BODY MASS INDEX DOCD: CPT | Performed by: INTERNAL MEDICINE

## 2021-03-29 NOTE — PROGRESS NOTES
The patient is a 13-year-old male who I know well from prior evaluation who comes in now for follow-up. He is frankly doing well with his CPAP device with average daily usage of 6 hours and 26 minutes and residual events are 4/h. His baseline was 7/h.   H

## 2021-10-21 ENCOUNTER — OFFICE VISIT (OUTPATIENT)
Dept: FAMILY MEDICINE CLINIC | Facility: CLINIC | Age: 46
End: 2021-10-21
Payer: COMMERCIAL

## 2021-10-21 VITALS
DIASTOLIC BLOOD PRESSURE: 70 MMHG | SYSTOLIC BLOOD PRESSURE: 102 MMHG | TEMPERATURE: 98 F | BODY MASS INDEX: 26.36 KG/M2 | HEART RATE: 90 BPM | HEIGHT: 75 IN | WEIGHT: 212 LBS

## 2021-10-21 DIAGNOSIS — Z99.89 OSA ON CPAP: ICD-10-CM

## 2021-10-21 DIAGNOSIS — G47.33 OSA ON CPAP: ICD-10-CM

## 2021-10-21 DIAGNOSIS — L91.0 KELOID SCAR OF SKIN: ICD-10-CM

## 2021-10-21 DIAGNOSIS — Z23 FLU VACCINE NEED: ICD-10-CM

## 2021-10-21 DIAGNOSIS — L73.9 FOLLICULITIS: ICD-10-CM

## 2021-10-21 DIAGNOSIS — Z00.00 ROUTINE PHYSICAL EXAMINATION: Primary | ICD-10-CM

## 2021-10-21 PROCEDURE — 3078F DIAST BP <80 MM HG: CPT | Performed by: FAMILY MEDICINE

## 2021-10-21 PROCEDURE — 99396 PREV VISIT EST AGE 40-64: CPT | Performed by: FAMILY MEDICINE

## 2021-10-21 PROCEDURE — 3074F SYST BP LT 130 MM HG: CPT | Performed by: FAMILY MEDICINE

## 2021-10-21 PROCEDURE — 93000 ELECTROCARDIOGRAM COMPLETE: CPT | Performed by: FAMILY MEDICINE

## 2021-10-21 PROCEDURE — 3008F BODY MASS INDEX DOCD: CPT | Performed by: FAMILY MEDICINE

## 2021-10-21 PROCEDURE — 90686 IIV4 VACC NO PRSV 0.5 ML IM: CPT | Performed by: FAMILY MEDICINE

## 2021-10-21 PROCEDURE — 90471 IMMUNIZATION ADMIN: CPT | Performed by: FAMILY MEDICINE

## 2021-10-21 NOTE — PATIENT INSTRUCTIONS
All adult screening ordered and done appropriate for patient's age and gender and risk factors and complaints. Medication reviewed and renewed where needed and appropriate. Encouraged physical fitness and daily physical activity daily.   Continue with [employer]

## 2021-10-21 NOTE — PROGRESS NOTES
Subjective:   Patient ID: Ashlie Jacob is a 48year old male.     48year old AA male with a history of EFRA here for complete preventive care physical and for status update on any confirmed chronic medical illnesses and follow up on any previous labs or proc Neurological:      General: No focal deficit present. Mental Status: He is alert and oriented to person, place, and time. Deep Tendon Reflexes: Reflexes normal.         Assessment & Plan:   1.  Routine physical examination  General well exam and

## 2021-11-03 ENCOUNTER — TELEPHONE (OUTPATIENT)
Dept: FAMILY MEDICINE CLINIC | Facility: CLINIC | Age: 46
End: 2021-11-03

## 2021-11-03 DIAGNOSIS — L91.0 KELOID OF SKIN: Primary | ICD-10-CM

## 2021-11-16 ENCOUNTER — OFFICE VISIT (OUTPATIENT)
Dept: SURGERY | Facility: CLINIC | Age: 46
End: 2021-11-16
Payer: COMMERCIAL

## 2021-11-16 VITALS — WEIGHT: 212 LBS | BODY MASS INDEX: 26.36 KG/M2 | HEIGHT: 75 IN

## 2021-11-16 DIAGNOSIS — L08.9 INFECTED SEBACEOUS CYST: ICD-10-CM

## 2021-11-16 DIAGNOSIS — L72.3 INFECTED SEBACEOUS CYST: ICD-10-CM

## 2021-11-16 DIAGNOSIS — D49.2 SKIN NEOPLASM: Primary | ICD-10-CM

## 2021-11-16 PROCEDURE — 99203 OFFICE O/P NEW LOW 30 MIN: CPT | Performed by: SURGERY

## 2021-11-16 PROCEDURE — 3008F BODY MASS INDEX DOCD: CPT | Performed by: SURGERY

## 2021-11-16 NOTE — H&P
History and Physical      HPI   Patient presents with:  Referral: Referral Dr. Rajwinder Bennett for multiple skin cysts. Patient has three that he would like excised. HPI  Erickson Colbert is a 48year old male who presents with 3 areas of concern.   The first comprehensive 10 point review of systems was completed. Pertinent positives and negatives noted in the the HPI. PHYSICAL EXAM   Ht 6' 3\" (1.905 m)   Wt 212 lb (96.2 kg)   BMI 26.50 kg/m²  No LMP for male patient.    Constitutional: appears well hydrate

## 2022-03-11 DIAGNOSIS — N52.9 ERECTILE DYSFUNCTION, UNSPECIFIED ERECTILE DYSFUNCTION TYPE: ICD-10-CM

## 2022-03-11 RX ORDER — TADALAFIL 20 MG/1
20 TABLET ORAL DAILY PRN
Qty: 4 TABLET | Refills: 5 | Status: SHIPPED | OUTPATIENT
Start: 2022-03-11 | End: 2023-07-25

## 2022-03-11 NOTE — TELEPHONE ENCOUNTER
Refill passed per [address] [facility] protocol.     Requested Prescriptions   Pending Prescriptions Disp Refills    TADALAFIL 20 MG Oral Tab [Pharmacy Med Name: TADALAFIL 20 MG TABLET] 4 tablet 5     Sig: TAKE 1 TABLET BY MOUTH EVERY DAY AS NEEDED FOR ERECTILE DYSFUNCTION        Genitourinary Medications Passed - 3/11/2022  3:34 PM        Passed - Patient does not have pulmonary hypertension on problem list        Passed - Appointment in the past 12 or next 3 months                  Recent Outpatient Visits              3 months ago Skin neoplasm    [address] [facility] Christen Cobb MD    Office Visit    4 months ago Routine physical examination    [address], SpencerFausto DO    Office Visit    11 months ago EFRA (obstructive sleep apnea)    [address] [place], Adelene Rubinstein, MD    Office Visit    1 year ago Routine physical examination    [address], [place], Fausto Robin DO    Office Visit    1 year ago EFRA (obstructive sleep apnea)    Mallory Hankins MD    Office Visit

## 2022-05-03 NOTE — TELEPHONE ENCOUNTER
Please advise on patient's request to resume Chantix. I pended the starter pack. [No Edema] : there was no peripheral edema [Normal] : affect was normal and insight and judgment were intact

## 2022-11-22 ENCOUNTER — OFFICE VISIT (OUTPATIENT)
Dept: FAMILY MEDICINE CLINIC | Facility: CLINIC | Age: 47
End: 2022-11-22
Payer: COMMERCIAL

## 2022-11-22 ENCOUNTER — LAB ENCOUNTER (OUTPATIENT)
Dept: LAB | Age: 47
End: 2022-11-22
Attending: FAMILY MEDICINE
Payer: COMMERCIAL

## 2022-11-22 VITALS
BODY MASS INDEX: 26.62 KG/M2 | DIASTOLIC BLOOD PRESSURE: 82 MMHG | HEIGHT: 75 IN | TEMPERATURE: 99 F | SYSTOLIC BLOOD PRESSURE: 122 MMHG | WEIGHT: 214.13 LBS | HEART RATE: 88 BPM

## 2022-11-22 DIAGNOSIS — Z00.00 ENCOUNTER FOR SCREENING AND PREVENTATIVE CARE: Primary | ICD-10-CM

## 2022-11-22 DIAGNOSIS — L73.0 ACNE KELOIDALIS NUCHAE: ICD-10-CM

## 2022-11-22 DIAGNOSIS — R94.31 ABNORMAL FINDING ON EKG: ICD-10-CM

## 2022-11-22 RX ORDER — MINOCYCLINE HYDROCHLORIDE 100 MG/1
CAPSULE ORAL
COMMUNITY
Start: 2022-11-09

## 2022-11-22 NOTE — PATIENT INSTRUCTIONS
All adult screening ordered and done appropriate for patient's age and gender and risk factors and complaints. Encouraged physical fitness and daily physical activity daily. Monitor blood pressures and record at home. Limit salt intake. Echocardiogram ordered. Posterior nuchal keloid injection with corticosteroid.

## 2022-11-22 NOTE — PROCEDURES
After sterile prep on the right side of the nuchal hairline, the keloid was injected with 0.5 mL 1% lidocaine/epi with 1 cc of 1% Kenalog subcutaneously without complication.

## 2022-11-23 LAB
ABSOLUTE BASOPHILS: 39 CELLS/UL (ref 0–200)
ABSOLUTE EOSINOPHILS: 87 CELLS/UL (ref 15–500)
ABSOLUTE LYMPHOCYTES: 2115 CELLS/UL (ref 850–3900)
ABSOLUTE MONOCYTES: 786 CELLS/UL (ref 200–950)
ABSOLUTE NEUTROPHILS: 6674 CELLS/UL (ref 1500–7800)
ALBUMIN/GLOBULIN RATIO: 1.5 (CALC) (ref 1–2.5)
ALBUMIN: 4.4 G/DL (ref 3.6–5.1)
ALKALINE PHOSPHATASE: 68 U/L (ref 36–130)
ALT: 31 U/L (ref 9–46)
AST: 28 U/L (ref 10–40)
BASOPHILS: 0.4 %
BILIRUBIN, TOTAL: 0.6 MG/DL (ref 0.2–1.2)
BUN: 13 MG/DL (ref 7–25)
CALCIUM: 9.6 MG/DL (ref 8.6–10.3)
CARBON DIOXIDE: 29 MMOL/L (ref 20–32)
CHLORIDE: 102 MMOL/L (ref 98–110)
CHOL/HDLC RATIO: 3.4 (CALC)
CHOLESTEROL, TOTAL: 199 MG/DL
CREATININE: 1.28 MG/DL (ref 0.6–1.29)
EGFR: 69 ML/MIN/1.73M2
EOSINOPHILS: 0.9 %
GLOBULIN: 2.9 G/DL (CALC) (ref 1.9–3.7)
GLUCOSE: 77 MG/DL (ref 65–99)
HDL CHOLESTEROL: 58 MG/DL
HEMATOCRIT: 43.7 % (ref 38.5–50)
HEMOGLOBIN: 14.7 G/DL (ref 13.2–17.1)
LDL-CHOLESTEROL: 123 MG/DL (CALC)
LYMPHOCYTES: 21.8 %
MCH: 30.9 PG (ref 27–33)
MCHC: 33.6 G/DL (ref 32–36)
MCV: 92 FL (ref 80–100)
MONOCYTES: 8.1 %
MPV: 9.3 FL (ref 7.5–12.5)
NEUTROPHILS: 68.8 %
NON-HDL CHOLESTEROL: 141 MG/DL (CALC)
PLATELET COUNT: 320 THOUSAND/UL (ref 140–400)
POTASSIUM: 4.9 MMOL/L (ref 3.5–5.3)
PROTEIN, TOTAL: 7.3 G/DL (ref 6.1–8.1)
RDW: 13.3 % (ref 11–15)
RED BLOOD CELL COUNT: 4.75 MILLION/UL (ref 4.2–5.8)
SODIUM: 137 MMOL/L (ref 135–146)
TOTAL PSA: 0.6 NG/ML
TRIGLYCERIDES: 81 MG/DL
TSH W/REFLEX TO FT4: 1.26 MIU/L (ref 0.4–4.5)
WHITE BLOOD CELL COUNT: 9.7 THOUSAND/UL (ref 3.8–10.8)

## 2023-03-27 ENCOUNTER — NURSE TRIAGE (OUTPATIENT)
Dept: FAMILY MEDICINE CLINIC | Facility: CLINIC | Age: 48
End: 2023-03-27

## 2023-03-30 ENCOUNTER — OFFICE VISIT (OUTPATIENT)
Dept: FAMILY MEDICINE CLINIC | Facility: CLINIC | Age: 48
End: 2023-03-30

## 2023-03-30 ENCOUNTER — LAB ENCOUNTER (OUTPATIENT)
Dept: LAB | Age: 48
End: 2023-03-30
Attending: FAMILY MEDICINE
Payer: COMMERCIAL

## 2023-03-30 VITALS
BODY MASS INDEX: 26.36 KG/M2 | TEMPERATURE: 98 F | HEART RATE: 90 BPM | HEIGHT: 75 IN | WEIGHT: 212 LBS | DIASTOLIC BLOOD PRESSURE: 77 MMHG | SYSTOLIC BLOOD PRESSURE: 138 MMHG

## 2023-03-30 DIAGNOSIS — Z23 NEED FOR PNEUMOCOCCAL VACCINATION: ICD-10-CM

## 2023-03-30 DIAGNOSIS — R42 VERTIGO: ICD-10-CM

## 2023-03-30 DIAGNOSIS — R42 DIZZINESS: ICD-10-CM

## 2023-03-30 DIAGNOSIS — Z23 NEED FOR ZOSTER VACCINATION: ICD-10-CM

## 2023-03-30 DIAGNOSIS — Z12.11 COLON CANCER SCREENING: Primary | ICD-10-CM

## 2023-03-30 DIAGNOSIS — R35.0 URINARY FREQUENCY: ICD-10-CM

## 2023-03-30 DIAGNOSIS — R68.82 DECREASED LIBIDO: ICD-10-CM

## 2023-03-30 RX ORDER — SODIUM CHLORIDE 0.65 %
AEROSOL, SPRAY (ML) NASAL
COMMUNITY

## 2023-03-30 NOTE — PATIENT INSTRUCTIONS
Urine studies to be collected and evaluated. Patient may need ENT and/or neurology assessment regarding dizziness/vertigo. Testosterone level to be determined on today. Patient eligible for and consents to pneumococcal vaccine. Patient interested in zoster vaccine, but will seek out the service at his local pharmacy as he is not quite 46years old until next week, April 10, 2023.

## 2023-04-01 LAB
APPEARANCE: CLEAR
BILIRUBIN: NEGATIVE
COLOR: YELLOW
GLUCOSE: NEGATIVE
KETONES: NEGATIVE
LEUKOCYTE ESTERASE: NEGATIVE
NITRITE: NEGATIVE
OCCULT BLOOD: NEGATIVE
PH: 5.5 (ref 5–8)
PROTEIN: NEGATIVE
SPECIFIC GRAVITY: 1.02 (ref 1–1.03)

## 2023-04-03 LAB
FREE TESTOSTERONE: 116.2 PG/ML (ref 35–155)
TESTOSTERONE, TOTAL,$/MS/MS: 527 NG/DL (ref 250–1100)

## 2023-05-05 ENCOUNTER — OFFICE VISIT (OUTPATIENT)
Dept: OTOLARYNGOLOGY | Facility: CLINIC | Age: 48
End: 2023-05-05
Payer: COMMERCIAL

## 2023-05-05 DIAGNOSIS — R42 VERTIGO: Primary | ICD-10-CM

## 2023-05-05 PROCEDURE — 92504 EAR MICROSCOPY EXAMINATION: CPT | Performed by: STUDENT IN AN ORGANIZED HEALTH CARE EDUCATION/TRAINING PROGRAM

## 2023-05-05 PROCEDURE — 99243 OFF/OP CNSLTJ NEW/EST LOW 30: CPT | Performed by: STUDENT IN AN ORGANIZED HEALTH CARE EDUCATION/TRAINING PROGRAM

## 2023-08-15 ENCOUNTER — PATIENT MESSAGE (OUTPATIENT)
Dept: PULMONOLOGY | Facility: CLINIC | Age: 48
End: 2023-08-15

## 2023-08-15 NOTE — TELEPHONE ENCOUNTER
From: Karen Delgado  To: Lavonne Moore MD  Sent: 8/15/2023 3:05 PM CDT  Subject: Sleep apena prescription     Murrel Schwab is possible that I get my prescription for sleep apena my Dentist needs it for me to start moving forward with getting the oral appliance,thank you

## 2023-08-16 NOTE — TELEPHONE ENCOUNTER
Placed an outgoing call to patient to clarify what prescription he was asking for. Pt clarified that he needs current CPAP prescription. Sent pt copy of sleep study and highlighted where to find prescription for CPAP.

## 2023-10-13 ENCOUNTER — HOSPITAL ENCOUNTER (OUTPATIENT)
Dept: GENERAL RADIOLOGY | Age: 48
Discharge: HOME | End: 2023-10-13
Attending: FAMILY MEDICINE
Payer: COMMERCIAL

## 2023-10-13 ENCOUNTER — LAB ENCOUNTER (OUTPATIENT)
Dept: LAB | Age: 48
End: 2023-10-13
Attending: FAMILY MEDICINE
Payer: COMMERCIAL

## 2023-10-13 ENCOUNTER — OFFICE VISIT (OUTPATIENT)
Dept: FAMILY MEDICINE CLINIC | Facility: CLINIC | Age: 48
End: 2023-10-13
Payer: COMMERCIAL

## 2023-10-13 VITALS
TEMPERATURE: 97 F | SYSTOLIC BLOOD PRESSURE: 120 MMHG | HEIGHT: 75 IN | DIASTOLIC BLOOD PRESSURE: 78 MMHG | WEIGHT: 208 LBS | BODY MASS INDEX: 25.86 KG/M2 | HEART RATE: 89 BPM

## 2023-10-13 DIAGNOSIS — Z23 FLU VACCINE NEED: ICD-10-CM

## 2023-10-13 DIAGNOSIS — M54.16 LUMBAR RADICULOPATHY: ICD-10-CM

## 2023-10-13 DIAGNOSIS — Z00.00 ROUTINE PHYSICAL EXAMINATION: ICD-10-CM

## 2023-10-13 DIAGNOSIS — Z12.11 COLON CANCER SCREENING: Primary | ICD-10-CM

## 2023-10-13 PROCEDURE — 99214 OFFICE O/P EST MOD 30 MIN: CPT | Performed by: FAMILY MEDICINE

## 2023-10-13 PROCEDURE — 3008F BODY MASS INDEX DOCD: CPT | Performed by: FAMILY MEDICINE

## 2023-10-13 PROCEDURE — 3074F SYST BP LT 130 MM HG: CPT | Performed by: FAMILY MEDICINE

## 2023-10-13 PROCEDURE — 3078F DIAST BP <80 MM HG: CPT | Performed by: FAMILY MEDICINE

## 2023-10-13 PROCEDURE — 72110 X-RAY EXAM L-2 SPINE 4/>VWS: CPT | Performed by: FAMILY MEDICINE

## 2023-10-13 PROCEDURE — 99396 PREV VISIT EST AGE 40-64: CPT | Performed by: FAMILY MEDICINE

## 2023-10-13 NOTE — PATIENT INSTRUCTIONS
All adult screening ordered and done appropriate for patient's age and gender and risk factors and complaints. Encouraged physical fitness and daily physical activity daily. Monitor blood pressures and record at home. Limit salt intake. May need PT regarding piriformis syndrome vs sciatica.

## 2023-10-13 NOTE — PROGRESS NOTES
Subjective:     Patient ID: Daryl Peterson is a 46year old male. This patient is a 76-year-old -American gentleman who is well-established at our clinic here for complete preventive care physical and for status update on any confirmed chronic medical illnesses and follow up on any previous labs or procedures that were suggestive or in need of further work up. Colonoscopy is due. Bowel, bladder, and sexual functions are intact. The following has been communicated to the specialist that will make an assessment for this patient's complaint:    Please evaluate this 76-year-old -American  with left iliac discomfort with a radicular pattern into the left lower extremity. Patient is presenting with sciatica type symptoms. Perhaps patient has piriformis syndrome on the left. History/Other:   Review of Systems  Current Outpatient Medications   Medication Sig Dispense Refill    Tadalafil 20 MG Oral Tab Take 1 tablet (20 mg total) by mouth daily as needed for Erectile Dysfunction. 16 tablet 1    Multiple Vitamins-Minerals (ULTRACHOICE ADV FORMULA MATURE) Oral Tab Take by mouth.  (Patient not taking: Reported on 10/13/2023)       Allergies:No Known Allergies    Past Medical History:   Diagnosis Date    Depression     Hernia, abdominal     hernia Rt. surgery [SITE NOT STATED]    Injury of hand, right     right hand injury; 2007 tendon repair right hand      Past Surgical History:   Procedure Laterality Date    ELECTROCARDIOGRAM, COMPLETE  5/3/14    scanned to media tab    HAND/FINGER SURGERY UNLISTED Right 2007    tendon repair right hand    HERNIA SURGERY Right     hernia Rt. surgery      Family History   Problem Relation Age of Onset    Cancer Father     Hypertension Father     Stroke Father     Cancer Mother       Social History:   Social History     Socioeconomic History    Marital status:     Number of children: 2   Tobacco Use    Smoking status: Every Day     Types: Cigars Smokeless tobacco: Never    Tobacco comments:     Smokes about 7-8 cigars per week no cigarettes. Vaping Use    Vaping Use: Never used   Substance and Sexual Activity    Alcohol use: No     Alcohol/week: 0.0 standard drinks of alcohol     Comment: socially    Drug use: No   Other Topics Concern    Caffeine Concern Yes     Comment: coffee, 1 cup        Objective:    10/13/23  1014   BP: 120/78   Pulse:    Temp:        Physical Exam  Constitutional:       General: He is not in acute distress. Appearance: Normal appearance. He is not ill-appearing. HENT:      Head: Atraumatic. Right Ear: Tympanic membrane normal.      Left Ear: Tympanic membrane normal.      Nose: Nose normal.      Mouth/Throat:      Mouth: Mucous membranes are moist.   Cardiovascular:      Rate and Rhythm: Normal rate and regular rhythm. Heart sounds: Normal heart sounds. Pulmonary:      Effort: Pulmonary effort is normal.      Breath sounds: Normal breath sounds. Abdominal:      General: Bowel sounds are normal.      Palpations: Abdomen is soft. There is no mass. Musculoskeletal:      Lumbar back: Tenderness present. Back:       Comments: Region of discomfort as depicted. Neurological:      Mental Status: He is alert and oriented to person, place, and time. Psychiatric:         Mood and Affect: Mood normal.         Assessment & Plan:   1. Routine physical examination  General well exam and the following have been ordered. Patient also request STI screening which has been ordered. - CBC With Diff  - CMP  - Lipid Panel  - TSH,  Reflex Free T4  - URINALYSIS, ROUTINE [5463][Q]  - PSA, TOTAL W REFLEX TO PSA, FREE [66593][Q]  - HCV Antibody  - Hepatitis B Surface Antigen  - HIV AG AB Combo  - T PALLIDUM SCREENING [place]  - Chlamydia/Gc Amplification    2. Colon cancer screening  Referred. - Gastro Referral - In Network    3. Flu vaccine need  Ordered today  - Fluzone Quadrivalent 6mo+ 0.5mL    4.  Lumbar radiculopathy  Ordered. - XR LUMBAR SPINE (MIN 4 VIEWS) (CPT=72110); Future  - Physiatry Referral - In Network        Orders Placed This Encounter      CBC With Diff      CMP      Lipid Panel      TSH,  Reflex Free T4      URINALYSIS, ROUTINE [3853][Q]      PSA, TOTAL W REFLEX TO PSA, FREE [08010][Q]      Fluzone Quadrivalent 6mo+ 0.5mL      Meds This Visit:  Requested Prescriptions      No prescriptions requested or ordered in this encounter       Imaging & Referrals:  INFLUENZA VACCINE, QUAD, PRESERVATIVE FREE, 0.5 ML  GASTRO - INTERNAL  PHYSIATRY - INTERNAL  XR LUMBAR SPINE (MIN 4 VIEWS) (CPT=72110)     Patient Instructions   All adult screening ordered and done appropriate for patient's age and gender and risk factors and complaints. Encouraged physical fitness and daily physical activity daily. Monitor blood pressures and record at home. Limit salt intake. May need PT regarding piriformis syndrome vs sciatica. Return in about 1 year (around 10/13/2024), or if symptoms worsen or fail to improve.

## 2023-10-16 LAB
ABSOLUTE BASOPHILS: 33 CELLS/UL (ref 0–200)
ABSOLUTE EOSINOPHILS: 131 CELLS/UL (ref 15–500)
ABSOLUTE LYMPHOCYTES: 1902 CELLS/UL (ref 850–3900)
ABSOLUTE MONOCYTES: 746 CELLS/UL (ref 200–950)
ABSOLUTE NEUTROPHILS: 5387 CELLS/UL (ref 1500–7800)
ALBUMIN/GLOBULIN RATIO: 1.4 (CALC) (ref 1–2.5)
ALBUMIN: 4.6 G/DL (ref 3.6–5.1)
ALKALINE PHOSPHATASE: 71 U/L (ref 35–144)
ALT: 29 U/L (ref 9–46)
APPEARANCE: CLEAR
AST: 24 U/L (ref 10–35)
BASOPHILS: 0.4 %
BILIRUBIN, TOTAL: 0.6 MG/DL (ref 0.2–1.2)
BILIRUBIN: NEGATIVE
BUN: 15 MG/DL (ref 7–25)
CALCIUM: 9.8 MG/DL (ref 8.6–10.3)
CARBON DIOXIDE: 25 MMOL/L (ref 20–32)
CHLAMYDIA TRACHOMATIS$RNA, TMA: NOT DETECTED
CHLORIDE: 104 MMOL/L (ref 98–110)
CHOL/HDLC RATIO: 3.7 (CALC)
CHOLESTEROL, TOTAL: 198 MG/DL
COLOR: YELLOW
CREATININE: 1.29 MG/DL (ref 0.7–1.3)
EGFR: 68 ML/MIN/1.73M2
EOSINOPHILS: 1.6 %
GLOBULIN: 3.4 G/DL (CALC) (ref 1.9–3.7)
GLUCOSE: 91 MG/DL (ref 65–99)
GLUCOSE: NEGATIVE
HDL CHOLESTEROL: 53 MG/DL
HEMATOCRIT: 45.7 % (ref 38.5–50)
HEMOGLOBIN: 15.6 G/DL (ref 13.2–17.1)
KETONES: NEGATIVE
LDL-CHOLESTEROL: 117 MG/DL (CALC)
LEUKOCYTE ESTERASE: NEGATIVE
LYMPHOCYTES: 23.2 %
MCH: 31.7 PG (ref 27–33)
MCHC: 34.1 G/DL (ref 32–36)
MCV: 92.9 FL (ref 80–100)
MONOCYTES: 9.1 %
MPV: 9.6 FL (ref 7.5–12.5)
NEISSERIA GONORRHOEAE$RNA, TMA: NOT DETECTED
NEUTROPHILS: 65.7 %
NITRITE: NEGATIVE
NON-HDL CHOLESTEROL: 145 MG/DL (CALC)
OCCULT BLOOD: NEGATIVE
PH: 6 (ref 5–8)
PLATELET COUNT: 303 THOUSAND/UL (ref 140–400)
POTASSIUM: 4.7 MMOL/L (ref 3.5–5.3)
PROTEIN, TOTAL: 8 G/DL (ref 6.1–8.1)
PROTEIN: NEGATIVE
RDW: 13.6 % (ref 11–15)
RED BLOOD CELL COUNT: 4.92 MILLION/UL (ref 4.2–5.8)
SODIUM: 137 MMOL/L (ref 135–146)
SPECIFIC GRAVITY: 1.02 (ref 1–1.03)
T. PALLIDUM AB, EIA: NEGATIVE
TOTAL PSA: 0.6 NG/ML
TRIGLYCERIDES: 168 MG/DL
TSH W/REFLEX TO FT4: 1.37 MIU/L (ref 0.4–4.5)
WHITE BLOOD CELL COUNT: 8.2 THOUSAND/UL (ref 3.8–10.8)

## 2023-10-21 DIAGNOSIS — N52.9 ERECTILE DYSFUNCTION, UNSPECIFIED ERECTILE DYSFUNCTION TYPE: ICD-10-CM

## 2023-10-22 RX ORDER — TADALAFIL 20 MG/1
20 TABLET ORAL DAILY PRN
Qty: 24 TABLET | Refills: 2 | Status: SHIPPED | OUTPATIENT
Start: 2023-10-22

## 2023-10-22 NOTE — TELEPHONE ENCOUNTER
Refill passed per [employer], [facility] protocol.      Requested Prescriptions   Pending Prescriptions Disp Refills    TADALAFIL 20 MG Oral Tab [Pharmacy Med Name: TADALAFIL 20 MG TABLET] 24 tablet 1     Sig: TAKE 1 TABLET BY MOUTH EVERY DAY AS NEEDED FOR ERECTILE DYSFUNCTION       Genitourinary Medications Passed - 10/21/2023  2:57 AM        Passed - Patient does not have pulmonary hypertension on problem list        Passed - In person appointment or virtual visit in the past 12 mos or appointment in next 3 mos     Recent Outpatient Visits              1 week ago Colon cancer screening    [address] [address] [place] [address] Aliyah Curiel, [place]    Office Visit    5 months ago Vertigo    [address], [address], [place], Jackie Bojorquez MD    Office Visit    6 months ago Colon cancer screening    [address] [place]    Office Visit    11 months ago Encounter for screening and preventative care    [address] [facility], [place]    Office Visit    1 year ago Skin neoplasm    Joseph Riley MD    Office Visit

## 2024-01-16 ENCOUNTER — MED REC SCAN ONLY (OUTPATIENT)
Dept: INTERNAL MEDICINE CLINIC | Facility: CLINIC | Age: 49
End: 2024-01-16

## 2024-01-17 ENCOUNTER — TELEPHONE (OUTPATIENT)
Dept: PULMONOLOGY | Facility: CLINIC | Age: 49
End: 2024-01-17

## 2024-01-17 DIAGNOSIS — G47.33 OSA (OBSTRUCTIVE SLEEP APNEA): Primary | ICD-10-CM

## 2024-01-17 NOTE — TELEPHONE ENCOUNTER
Received fax from [facility] for CPAP orders. Placed in Dr Mullins's folder for signature.

## 2024-01-18 NOTE — TELEPHONE ENCOUNTER
Patient has not seen Dr. Mullins since 3/29/21 and no upcoming appointments scheduled at this time. [employer] message sent to patient encouraging to schedule follow up appointment. Unsigned order faxed back to E noting that appointment is required for further orders.

## 2024-01-19 NOTE — TELEPHONE ENCOUNTER
Dr. Mullins, Patient is requesting for cpap supplies. Was last seen 3/29/21 but has scheduled an appointment for 5/24. Would we be able to authorize cpap supplies. I have pended order for review.

## 2024-05-06 ENCOUNTER — OFFICE VISIT (OUTPATIENT)
Dept: PULMONOLOGY | Facility: CLINIC | Age: 49
End: 2024-05-06
Payer: COMMERCIAL

## 2024-05-06 VITALS
WEIGHT: 210 LBS | HEART RATE: 78 BPM | OXYGEN SATURATION: 98 % | DIASTOLIC BLOOD PRESSURE: 79 MMHG | SYSTOLIC BLOOD PRESSURE: 135 MMHG | BODY MASS INDEX: 26.95 KG/M2 | HEIGHT: 74 IN

## 2024-05-06 DIAGNOSIS — G47.33 OSA (OBSTRUCTIVE SLEEP APNEA): Primary | ICD-10-CM

## 2024-05-06 NOTE — PROGRESS NOTES
The patient is a 49-year-old male who I know well from prior evaluation comes in now for follow-up.  In general, he is doing well.  His download data from his CPAP device is excellent with average daily usage 5 hours and 45 minutes and residual events of 1.9/h.  He is benefiting from ongoing usage.    Review of Systems:  Vision normal. Ear nose and throat normal. Bowel normal. Bladder function normal. No depression. No thyroid disease. No lymphatic system concerns.  No rash. Muscles and joints unremarkable. No weight loss no weight gain.    Physical Examination:  Vital signs normal. HEENT examination is unremarkable with pupils equal round and reactive to light and accommodation. Neck without adenopathy, thyromegaly, JVD nor bruit. Lungs clear to auscultation and percussion. Cardiac regular rate and rhythm no murmur. Abdomen nontender, without hepatosplenomegaly and no mass appreciable. Extremities and Musculoskeletal without clubbing cyanosis nor edema, and mobility acceptable. Neurologic grossly intact with symmetric tone and strength and reflex.    Assessment and plan:  1.  Mild EFRA-continue to use the PAP therapy which I think is the best option.    Recommendations: Vigilance with CPAP every night all night, avoid alcohol, avoid sedating drug, never drive if sleepy, see me again in the office at the 1 year interval or sooner if needed and contact me promptly if new troubles.

## 2024-05-06 NOTE — PROGRESS NOTES
Faxed [facility]. Attached ovn and sleep study. Confirmation received and sent to scanning

## 2024-05-08 ENCOUNTER — TELEPHONE (OUTPATIENT)
Dept: PULMONOLOGY | Facility: CLINIC | Age: 49
End: 2024-05-08

## 2024-05-08 NOTE — TELEPHONE ENCOUNTER
Addendum made by Dr. Mullins.    Faxed chart notes, sleep study reports, signed order and demographics with confirmation. Signed order sent for scanning.

## 2024-05-08 NOTE — TELEPHONE ENCOUNTER
Received fax from [facility] requesting signed detailed written order and chart notes. 5/6/24 chart notes do not discuss referral for oral appliance of failure/refusing/non-compliance with PAP therapy. Spoke with patient, confirmed that he is interested in evaluation for oral appliance already has appointment scheduled. He states he hates his CPAP.    Dr. Mullins- could you please addend chart notes to reflect referral for oral appliance therapy?

## 2024-05-15 ENCOUNTER — OFFICE VISIT (OUTPATIENT)
Dept: FAMILY MEDICINE CLINIC | Facility: CLINIC | Age: 49
End: 2024-05-15

## 2024-05-15 VITALS
WEIGHT: 204 LBS | BODY MASS INDEX: 25.36 KG/M2 | HEIGHT: 75 IN | OXYGEN SATURATION: 97 % | SYSTOLIC BLOOD PRESSURE: 136 MMHG | TEMPERATURE: 97 F | HEART RATE: 97 BPM | DIASTOLIC BLOOD PRESSURE: 74 MMHG

## 2024-05-15 DIAGNOSIS — M79.10 MYALGIA: ICD-10-CM

## 2024-05-15 DIAGNOSIS — M25.512 ACUTE PAIN OF LEFT SHOULDER: ICD-10-CM
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
## 2024-05-15 DIAGNOSIS — M54.6 ACUTE BILATERAL THORACIC BACK PAIN: ICD-10-CM

## 2024-05-15 DIAGNOSIS — M54.2 NECK PAIN: ICD-10-CM

## 2024-05-15 PROCEDURE — 99213 OFFICE O/P EST LOW 20 MIN: CPT

## 2024-05-15 RX ORDER — CLINDAMYCIN PHOSPHATE 10 UG/ML
LOTION TOPICAL
COMMUNITY
Start: 2024-04-11

## 2024-05-15 RX ORDER — CYCLOBENZAPRINE HCL 5 MG
5 TABLET ORAL NIGHTLY PRN
Qty: 30 TABLET | Refills: 0 | Status: SHIPPED | OUTPATIENT
Start: 2024-05-15

## 2024-05-15 RX ORDER — TRETINOIN 0.5 MG/G
CREAM TOPICAL
COMMUNITY
Start: 2024-04-11
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
## 2024-08-19 NOTE — PATIENT INSTRUCTIONS
All adult screening ordered and done appropriate for patient's age and gender and risk factors and complaints.  Encouraged physical fitness and daily physical activity daily.  Monitor blood pressures and record at home. Limit salt intake.    Quitting Smoking    Quitting smoking is the most important step you can take to improve your health. We're glad you have set a goal to improve your health.    Quit Smoking Resources    In addition to medications, use the STAR plan to help you successfully quit.   Stick with your quit date!   Tell friends, family, and coworkers your quit date. Request their understanding and support.  Anticipate and prepare for challenges. Some examples are withdrawal symptoms, being around others who smoke, and drinking alcohol.  Remove all tobacco products and paraphernalia from your environment. Make your home and vehicles smoke-free.    Free resources for additional support:  National tobacco quitline: 1-800-QUIT-NOW (1-242.989.9289).  SmokefreeTXT is a free text program to assist you in quitting. Visit https://www.smokefree.gov/smokefreetxt for more information.  Feel free to call your care manager at ([phone]) for additional support.

## 2024-08-20 ENCOUNTER — LAB ENCOUNTER (OUTPATIENT)
Dept: LAB | Age: 49
End: 2024-08-20
Attending: FAMILY MEDICINE
Payer: COMMERCIAL

## 2024-08-20 DIAGNOSIS — Z11.3 ROUTINE SCREENING FOR STI (SEXUALLY TRANSMITTED INFECTION): ICD-10-CM

## 2024-08-20 DIAGNOSIS — Z00.00 ROUTINE PHYSICAL EXAMINATION: ICD-10-CM

## 2024-08-20 LAB
ALBUMIN SERPL-MCNC: 4.7 G/DL (ref 3.2–4.8)
ALBUMIN/GLOB SERPL: 1.5 {RATIO} (ref 1–2)
ALP LIVER SERPL-CCNC: 73 U/L
ALT SERPL-CCNC: 44 U/L
ANION GAP SERPL CALC-SCNC: 4 MMOL/L (ref 0–18)
AST SERPL-CCNC: 32 U/L (ref ?–34)
BASOPHILS # BLD AUTO: 0.04 X10(3) UL (ref 0–0.2)
BASOPHILS NFR BLD AUTO: 0.6 %
BILIRUB SERPL-MCNC: 0.5 MG/DL (ref 0.3–1.2)
BILIRUB UR QL STRIP.AUTO: NEGATIVE
BUN BLD-MCNC: 13 MG/DL (ref 9–23)
CALCIUM BLD-MCNC: 9.7 MG/DL (ref 8.7–10.4)
CHLORIDE SERPL-SCNC: 104 MMOL/L (ref 98–112)
CHOLEST SERPL-MCNC: 199 MG/DL (ref ?–200)
CLARITY UR REFRACT.AUTO: CLEAR
CO2 SERPL-SCNC: 29 MMOL/L (ref 21–32)
CREAT BLD-MCNC: 1.25 MG/DL
EGFRCR SERPLBLD CKD-EPI 2021: 70 ML/MIN/1.73M2 (ref 60–?)
EOSINOPHIL # BLD AUTO: 0.11 X10(3) UL (ref 0–0.7)
EOSINOPHIL NFR BLD AUTO: 1.7 %
ERYTHROCYTE [DISTWIDTH] IN BLOOD BY AUTOMATED COUNT: 14.2 %
FASTING PATIENT LIPID ANSWER: NO
FASTING STATUS PATIENT QL REPORTED: NO
GLOBULIN PLAS-MCNC: 3.2 G/DL (ref 2–3.5)
GLUCOSE BLD-MCNC: 113 MG/DL (ref 70–99)
GLUCOSE UR STRIP.AUTO-MCNC: NORMAL MG/DL
HAV AB SER QL IA: NONREACTIVE
HBV CORE AB SERPL QL IA: NONREACTIVE
HBV SURFACE AB SER QL: NONREACTIVE
HBV SURFACE AB SERPL IA-ACNC: <3.1 MIU/ML
HBV SURFACE AG SERPL QL IA: NONREACTIVE
HCT VFR BLD AUTO: 41.9 %
HCV AB SERPL QL IA: NONREACTIVE
HDLC SERPL-MCNC: 53 MG/DL (ref 40–59)
HGB BLD-MCNC: 14.8 G/DL
IMM GRANULOCYTES # BLD AUTO: 0.01 X10(3) UL (ref 0–1)
IMM GRANULOCYTES NFR BLD: 0.2 %
KETONES UR STRIP.AUTO-MCNC: NEGATIVE MG/DL
LDLC SERPL CALC-MCNC: 116 MG/DL (ref ?–100)
LEUKOCYTE ESTERASE UR QL STRIP.AUTO: NEGATIVE
LYMPHOCYTES # BLD AUTO: 2.29 X10(3) UL (ref 1–4)
LYMPHOCYTES NFR BLD AUTO: 34.6 %
MCH RBC QN AUTO: 31.6 PG (ref 26–34)
MCHC RBC AUTO-ENTMCNC: 35.3 G/DL (ref 31–37)
MCV RBC AUTO: 89.3 FL
MONOCYTES # BLD AUTO: 0.62 X10(3) UL (ref 0.1–1)
MONOCYTES NFR BLD AUTO: 9.4 %
NEUTROPHILS # BLD AUTO: 3.55 X10 (3) UL (ref 1.5–7.7)
NEUTROPHILS # BLD AUTO: 3.55 X10(3) UL (ref 1.5–7.7)
NEUTROPHILS NFR BLD AUTO: 53.5 %
NITRITE UR QL STRIP.AUTO: NEGATIVE
NONHDLC SERPL-MCNC: 146 MG/DL (ref ?–130)
OSMOLALITY SERPL CALC.SUM OF ELEC: 285 MOSM/KG (ref 275–295)
PH UR STRIP.AUTO: 6.5 [PH] (ref 5–8)
PLATELET # BLD AUTO: 296 10(3)UL (ref 150–450)
POTASSIUM SERPL-SCNC: 4 MMOL/L (ref 3.5–5.1)
PROT SERPL-MCNC: 7.9 G/DL (ref 5.7–8.2)
PROT UR STRIP.AUTO-MCNC: NEGATIVE MG/DL
RBC # BLD AUTO: 4.69 X10(6)UL
RBC UR QL AUTO: NEGATIVE
SODIUM SERPL-SCNC: 137 MMOL/L (ref 136–145)
SP GR UR STRIP.AUTO: 1.02 (ref 1–1.03)
T PALLIDUM AB SER QL IA: NONREACTIVE
TRIGL SERPL-MCNC: 169 MG/DL (ref 30–149)
TSI SER-ACNC: 2 MIU/ML (ref 0.55–4.78)
UROBILINOGEN UR STRIP.AUTO-MCNC: NORMAL MG/DL
VLDLC SERPL CALC-MCNC: 29 MG/DL (ref 0–30)
WBC # BLD AUTO: 6.6 X10(3) UL (ref 4–11)

## 2024-08-20 PROCEDURE — 86704 HEP B CORE ANTIBODY TOTAL: CPT

## 2024-08-20 PROCEDURE — 84443 ASSAY THYROID STIM HORMONE: CPT

## 2024-08-20 PROCEDURE — 87591 N.GONORRHOEAE DNA AMP PROB: CPT

## 2024-08-20 PROCEDURE — 87491 CHLMYD TRACH DNA AMP PROBE: CPT

## 2024-08-20 PROCEDURE — 80061 LIPID PANEL: CPT

## 2024-08-20 PROCEDURE — 86708 HEPATITIS A ANTIBODY: CPT

## 2024-08-20 PROCEDURE — 85025 COMPLETE CBC W/AUTO DIFF WBC: CPT

## 2024-08-20 PROCEDURE — 87389 HIV-1 AG W/HIV-1&-2 AB AG IA: CPT

## 2024-08-20 PROCEDURE — 86803 HEPATITIS C AB TEST: CPT

## 2024-08-20 PROCEDURE — 86780 TREPONEMA PALLIDUM: CPT

## 2024-08-20 PROCEDURE — 36415 COLL VENOUS BLD VENIPUNCTURE: CPT

## 2024-08-20 PROCEDURE — 86706 HEP B SURFACE ANTIBODY: CPT

## 2024-08-20 PROCEDURE — 81003 URINALYSIS AUTO W/O SCOPE: CPT

## 2024-08-20 PROCEDURE — 87340 HEPATITIS B SURFACE AG IA: CPT

## 2024-08-20 PROCEDURE — 80053 COMPREHEN METABOLIC PANEL: CPT

## 2024-08-20 PROCEDURE — 80503 PATH CLIN CONSLTJ SF 5-20: CPT

## 2024-08-21 LAB
C TRACH DNA SPEC QL NAA+PROBE: NEGATIVE
N GONORRHOEA DNA SPEC QL NAA+PROBE: NEGATIVE

## 2024-08-26 NOTE — PROGRESS NOTES
Subjective:     Patient ID: Thiago Sommers is a 49 year old male.    This patient is a 49-year-old -American gentleman here for complete preventive care physical and for status update on any confirmed chronic medical illnesses and follow up on any previous labs or procedures that were suggestive or in need of further work up. Colonoscopy is due. Bowel, bladder, and sexual functions are intact.    Pneumococcal vaccine recommended, but patient declines.    Additional screenings requested.  Asymptomatic and no known contacts.                  History/Other:   Review of Systems  Current Outpatient Medications   Medication Sig Dispense Refill    clindamycin 1 % External Lotion APPLY ONCE A DAY OR MOST DAYS AS A SPOT TREATMENT WHEN FLARES PRESENT. (Patient not taking: Reported on 8/19/2024)      Tretinoin 0.05 % External Cream APPLY AT BEDTIME TO POSTERIOR SCALP AND NECK. REFER TO PRACTICE HANDOUT FOR SPECIFIC INSTRUCTIONS. (Patient not taking: Reported on 8/19/2024)      cyclobenzaprine 5 MG Oral Tab Take 1 tablet (5 mg total) by mouth nightly as needed for Muscle spasms. (Patient not taking: Reported on 8/19/2024) 30 tablet 0    Tadalafil 20 MG Oral Tab Take 1 tablet (20 mg total) by mouth daily as needed for Erectile Dysfunction. (Patient not taking: Reported on 8/19/2024) 24 tablet 2    Multiple Vitamins-Minerals (ULTRACHOICE ADV FORMULA MATURE) Oral Tab Take by mouth. (Patient not taking: Reported on 10/13/2023)       Allergies:No Known Allergies    Past Medical History:    Hernia, abdominal    hernia Rt. surgery [SITE NOT STATED]    Injury of hand, right    right hand injury; 2007 tendon repair right hand      Past Surgical History:   Procedure Laterality Date    Electrocardiogram, complete  5/3/14    scanned to media tab    Hand/finger surgery unlisted Right 2007    tendon repair right hand    Hernia surgery Right     hernia Rt. surgery      Family History   Problem Relation Age of Onset    Cancer Father      Hypertension Father     Stroke Father     Other (EtOH abuse) Father     Cancer Mother       Social History:   Social History     Socioeconomic History    Marital status:     Number of children: 2   Tobacco Use    Smoking status: Every Day     Types: Cigars    Smokeless tobacco: Never    Tobacco comments:     Smokes about 7-8 cigars per week no cigarettes.    Vaping Use    Vaping status: Never Used   Substance and Sexual Activity    Alcohol use: Yes     Alcohol/week: 2.0 standard drinks of alcohol     Types: 1 Cans of beer, 1 Shots of liquor per week     Comment: 6 times/week    Drug use: Yes     Frequency: 1.0 times per week     Types: Cannabis     Comment: Last use-  several days ago   Other Topics Concern    Caffeine Concern Yes     Comment: coffee, 1 cup        Objective:   Vitals:    08/19/24 1821   BP: 136/78   Pulse:    Resp:    Temp:        Physical Exam  Constitutional:       General: He is not in acute distress.     Appearance: Normal appearance. He is not ill-appearing.   HENT:      Head: Normocephalic and atraumatic.      Right Ear: Tympanic membrane normal.      Left Ear: Tympanic membrane normal.      Nose: Nose normal.      Mouth/Throat:      Mouth: Mucous membranes are moist.   Neck:      Thyroid: No thyromegaly.   Cardiovascular:      Rate and Rhythm: Normal rate and regular rhythm.      Heart sounds: Normal heart sounds.   Pulmonary:      Breath sounds: Normal breath sounds.   Abdominal:      General: Bowel sounds are normal.      Palpations: Abdomen is soft. There is no mass.   Neurological:      Mental Status: He is alert and oriented to person, place, and time.   Psychiatric:         Mood and Affect: Mood normal.         Assessment & Plan:   1. Routine physical examination  General Well exam the following labs have been ordered.  - CBC W Differential W Platelet [E]; Future  - Comp Metabolic Panel (14) [E]; Future  - Lipid Panel [E]; Future  - TSH [E]; Future  - Urinalysis, Routine [E];  Future    2. Colon cancer screening  Referred.  - Gastro Referral - [facility] ([place])    3. Pneumococcal vaccination declined by patient  Declined by patient.  - Prevnar 20 (PCV20) [19270]    4. Routine screening for STI (sexually transmitted infection)  Ordered.  Asymptomatic.  No known contacts.  - HIV AG AB Combo [E]; Future  - Chlamydia/Gc Amplification; Future  - T Pallidum Screening [place]; Future  - Hepatitis A B + C profile [E]; Future    5. Cigar smoker  Education for the cessation of any use of tobacco provided to the patient.  - Tobacco Use Counseling 3-10 Min [46983]      Orders Placed This Encounter   Procedures    CBC W Differential W Platelet [E]    Comp Metabolic Panel (14) [E]    Lipid Panel [E]    TSH [E]    Urinalysis, Routine [E]    HIV AG AB Combo [E]    T Pallidum Screening [place]    Hepatitis A B + C profile [E]    Prevnar 20 (PCV20) [28186]    Tobacco Use Counseling 3-10 Min [62242]    Chlamydia/Gc Amplification       Meds This Visit:  Requested Prescriptions      No prescriptions requested or ordered in this encounter       Imaging & Referrals:  PCV20 VACCINE FOR INTRAMUSCULAR USE  GASTRO - INTERNAL     Patient Instructions   All adult screening ordered and done appropriate for patient's age and gender and risk factors and complaints.  Encouraged physical fitness and daily physical activity daily.  Monitor blood pressures and record at home. Limit salt intake.    Quitting Smoking    Quitting smoking is the most important step you can take to improve your health. We're glad you have set a goal to improve your health.    Quit Smoking Resources    In addition to medications, use the STAR plan to help you successfully quit.   Stick with your quit date!   Tell friends, family, and coworkers your quit date. Request their understanding and support.  Anticipate and prepare for challenges. Some examples are withdrawal symptoms, being around others who smoke, and drinking alcohol.  Remove all  tobacco products and paraphernalia from your environment. Make your home and vehicles smoke-free.    Free resources for additional support:  National tobacco quitline: 1-800-QUIT-NOW (1-720.506.4845).  SmokefreeTXT is a free text program to assist you in quitting. Visit https://www.smokefree.gov/smokefreetxt for more information.  Feel free to call your care manager at ([phone]) for additional support.   Return if symptoms worsen or fail to improve.

## 2025-01-22 ENCOUNTER — ORDER TRANSCRIPTION (OUTPATIENT)
Dept: SLEEP CENTER | Age: 50
End: 2025-01-22

## 2025-01-22 DIAGNOSIS — G47.33 OBSTRUCTIVE SLEEP APNEA (ADULT) (PEDIATRIC): Primary | ICD-10-CM

## 2025-02-27 DIAGNOSIS — N52.9 ERECTILE DYSFUNCTION, UNSPECIFIED ERECTILE DYSFUNCTION TYPE: ICD-10-CM

## 2025-03-04 RX ORDER — TADALAFIL 20 MG/1
20 TABLET ORAL DAILY PRN
Qty: 24 TABLET | Refills: 2 | Status: SHIPPED | OUTPATIENT
Start: 2025-03-04

## 2025-07-25 ENCOUNTER — TELEPHONE (OUTPATIENT)
Dept: PULMONOLOGY | Facility: CLINIC | Age: 50
End: 2025-07-25

## 2025-07-25 NOTE — TELEPHONE ENCOUNTER
Received message from [facility] that patient has not obtained sleep study to complete oral appliance program. [facility] already sent same letter to notify patient regarding this. Last sleep study sent from out office with oral appliance order on 5/8/24

## 2025-08-29 ENCOUNTER — OFFICE VISIT (OUTPATIENT)
Dept: FAMILY MEDICINE CLINIC | Facility: CLINIC | Age: 50
End: 2025-08-29

## 2025-08-29 VITALS
HEIGHT: 75 IN | TEMPERATURE: 98 F | WEIGHT: 202 LBS | OXYGEN SATURATION: 98 % | SYSTOLIC BLOOD PRESSURE: 130 MMHG | RESPIRATION RATE: 18 BRPM | BODY MASS INDEX: 25.12 KG/M2 | HEART RATE: 68 BPM | DIASTOLIC BLOOD PRESSURE: 80 MMHG

## 2025-08-29 DIAGNOSIS — Z28.21 PNEUMOCOCCAL VACCINATION DECLINED BY PATIENT: ICD-10-CM

## 2025-08-29 DIAGNOSIS — F17.290 CIGAR SMOKER: ICD-10-CM

## 2025-08-29 DIAGNOSIS — Z11.3 ROUTINE SCREENING FOR STI (SEXUALLY TRANSMITTED INFECTION): ICD-10-CM

## 2025-08-29 DIAGNOSIS — Z12.11 COLON CANCER SCREENING: ICD-10-CM

## 2025-08-29 DIAGNOSIS — N52.9 ERECTILE DYSFUNCTION, UNSPECIFIED ERECTILE DYSFUNCTION TYPE: ICD-10-CM

## 2025-08-29 DIAGNOSIS — Z00.00 ROUTINE PHYSICAL EXAMINATION: Primary | ICD-10-CM

## 2025-08-29 LAB
ALBUMIN SERPL-MCNC: 5 G/DL (ref 3.2–4.8)
ALBUMIN/GLOB SERPL: 1.5 (ref 1–2)
ALP LIVER SERPL-CCNC: 70 U/L (ref 45–117)
ALT SERPL-CCNC: 46 U/L (ref 10–49)
ANION GAP SERPL CALC-SCNC: 5 MMOL/L (ref 0–18)
AST SERPL-CCNC: 34 U/L (ref ?–34)
BASOPHILS # BLD AUTO: 0.03 X10(3) UL (ref 0–0.2)
BASOPHILS NFR BLD AUTO: 0.5 %
BILIRUB SERPL-MCNC: 0.5 MG/DL (ref 0.3–1.2)
BILIRUB UR QL: NEGATIVE
BUN BLD-MCNC: 13 MG/DL (ref 9–23)
BUN/CREAT SERPL: 10.1 (ref 10–20)
CALCIUM BLD-MCNC: 9.7 MG/DL (ref 8.7–10.4)
CHLORIDE SERPL-SCNC: 106 MMOL/L (ref 98–112)
CHOLEST SERPL-MCNC: 208 MG/DL (ref ?–200)
CLARITY UR: CLEAR
CO2 SERPL-SCNC: 30 MMOL/L (ref 21–32)
COMPLEXED PSA SERPL-MCNC: 0.8 NG/ML (ref ?–4)
CREAT BLD-MCNC: 1.29 MG/DL (ref 0.7–1.3)
DEPRECATED RDW RBC AUTO: 49.9 FL (ref 35.1–46.3)
EGFRCR SERPLBLD CKD-EPI 2021: 67 ML/MIN/1.73M2 (ref 60–?)
EOSINOPHIL # BLD AUTO: 0.12 X10(3) UL (ref 0–0.7)
EOSINOPHIL NFR BLD AUTO: 2.1 %
ERYTHROCYTE [DISTWIDTH] IN BLOOD BY AUTOMATED COUNT: 14.6 % (ref 11–15)
FASTING PATIENT LIPID ANSWER: YES
FASTING STATUS PATIENT QL REPORTED: YES
GLOBULIN PLAS-MCNC: 3.3 G/DL (ref 2–3.5)
GLUCOSE BLD-MCNC: 96 MG/DL (ref 70–99)
GLUCOSE UR-MCNC: NORMAL MG/DL
HAV AB SER QL IA: NONREACTIVE
HBV CORE AB SERPL QL IA: NONREACTIVE
HBV SURFACE AB SER QL: NONREACTIVE
HBV SURFACE AB SERPL IA-ACNC: <3.1 MIU/ML
HBV SURFACE AG SERPL QL IA: NONREACTIVE
HCT VFR BLD AUTO: 42.9 % (ref 39–53)
HCV AB SERPL QL IA: NONREACTIVE
HDLC SERPL-MCNC: 72 MG/DL (ref 40–59)
HGB BLD-MCNC: 14.5 G/DL (ref 13–17.5)
HGB UR QL STRIP.AUTO: NEGATIVE
IMM GRANULOCYTES # BLD AUTO: 0.02 X10(3) UL (ref 0–1)
IMM GRANULOCYTES NFR BLD: 0.3 %
KETONES UR-MCNC: NEGATIVE MG/DL
LDLC SERPL CALC-MCNC: 120 MG/DL (ref ?–100)
LEUKOCYTE ESTERASE UR QL STRIP.AUTO: NEGATIVE
LYMPHOCYTES # BLD AUTO: 2.03 X10(3) UL (ref 1–4)
LYMPHOCYTES NFR BLD AUTO: 35.5 %
MCH RBC QN AUTO: 31.3 PG (ref 26–34)
MCHC RBC AUTO-ENTMCNC: 33.8 G/DL (ref 31–37)
MCV RBC AUTO: 92.7 FL (ref 80–100)
MONOCYTES # BLD AUTO: 0.61 X10(3) UL (ref 0.1–1)
MONOCYTES NFR BLD AUTO: 10.7 %
NEUTROPHILS # BLD AUTO: 2.91 X10 (3) UL (ref 1.5–7.7)
NEUTROPHILS # BLD AUTO: 2.91 X10(3) UL (ref 1.5–7.7)
NEUTROPHILS NFR BLD AUTO: 50.9 %
NITRITE UR QL STRIP.AUTO: NEGATIVE
NONHDLC SERPL-MCNC: 136 MG/DL (ref ?–130)
OSMOLALITY SERPL CALC.SUM OF ELEC: 292 MOSM/KG (ref 275–295)
PH UR: 5.5 (ref 5–8)
PLATELET # BLD AUTO: 306 10(3)UL (ref 150–450)
POTASSIUM SERPL-SCNC: 4.6 MMOL/L (ref 3.5–5.1)
PROT SERPL-MCNC: 8.3 G/DL (ref 5.7–8.2)
PROT UR-MCNC: NEGATIVE MG/DL
RBC # BLD AUTO: 4.63 X10(6)UL (ref 4.3–5.7)
SODIUM SERPL-SCNC: 141 MMOL/L (ref 136–145)
SP GR UR STRIP: 1.02 (ref 1–1.03)
T PALLIDUM AB SER QL IA: NONREACTIVE
TRIGL SERPL-MCNC: 91 MG/DL (ref 30–149)
TSI SER-ACNC: 0.9 UIU/ML (ref 0.55–4.78)
UROBILINOGEN UR STRIP-ACNC: NORMAL
VLDLC SERPL CALC-MCNC: 16 MG/DL (ref 0–30)
WBC # BLD AUTO: 5.7 X10(3) UL (ref 4–11)

## 2025-08-29 PROCEDURE — 87340 HEPATITIS B SURFACE AG IA: CPT | Performed by: FAMILY MEDICINE

## 2025-08-29 PROCEDURE — 87389 HIV-1 AG W/HIV-1&-2 AB AG IA: CPT | Performed by: FAMILY MEDICINE

## 2025-08-29 PROCEDURE — 86704 HEP B CORE ANTIBODY TOTAL: CPT | Performed by: FAMILY MEDICINE

## 2025-08-29 PROCEDURE — 86803 HEPATITIS C AB TEST: CPT | Performed by: FAMILY MEDICINE

## 2025-08-29 PROCEDURE — 80061 LIPID PANEL: CPT | Performed by: FAMILY MEDICINE

## 2025-08-29 PROCEDURE — 86780 TREPONEMA PALLIDUM: CPT | Performed by: FAMILY MEDICINE

## 2025-08-29 PROCEDURE — 81003 URINALYSIS AUTO W/O SCOPE: CPT | Performed by: FAMILY MEDICINE

## 2025-08-29 PROCEDURE — 86706 HEP B SURFACE ANTIBODY: CPT | Performed by: FAMILY MEDICINE

## 2025-08-29 PROCEDURE — 80053 COMPREHEN METABOLIC PANEL: CPT | Performed by: FAMILY MEDICINE

## 2025-08-29 PROCEDURE — 87491 CHLMYD TRACH DNA AMP PROBE: CPT | Performed by: FAMILY MEDICINE

## 2025-08-29 PROCEDURE — 87591 N.GONORRHOEAE DNA AMP PROB: CPT | Performed by: FAMILY MEDICINE

## 2025-08-29 PROCEDURE — 80503 PATH CLIN CONSLTJ SF 5-20: CPT | Performed by: FAMILY MEDICINE

## 2025-08-29 PROCEDURE — 85025 COMPLETE CBC W/AUTO DIFF WBC: CPT | Performed by: FAMILY MEDICINE

## 2025-08-29 PROCEDURE — 86708 HEPATITIS A ANTIBODY: CPT | Performed by: FAMILY MEDICINE

## 2025-08-29 PROCEDURE — 84443 ASSAY THYROID STIM HORMONE: CPT | Performed by: FAMILY MEDICINE

## 2025-08-29 RX ORDER — TADALAFIL 20 MG/1
20 TABLET ORAL DAILY PRN
Qty: 24 TABLET | Refills: 2 | Status: SHIPPED | OUTPATIENT
Start: 2025-08-29

## 2025-08-31 ENCOUNTER — RESULTS FOLLOW-UP (OUTPATIENT)
Dept: FAMILY MEDICINE CLINIC | Facility: CLINIC | Age: 50
End: 2025-08-31

## 2025-08-31 DIAGNOSIS — E88.09 HYPERPROTEINEMIA: Primary | ICD-10-CM
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
